# Patient Record
Sex: MALE | Race: BLACK OR AFRICAN AMERICAN | NOT HISPANIC OR LATINO
[De-identification: names, ages, dates, MRNs, and addresses within clinical notes are randomized per-mention and may not be internally consistent; named-entity substitution may affect disease eponyms.]

---

## 2017-01-12 ENCOUNTER — APPOINTMENT (OUTPATIENT)
Dept: FAMILY MEDICINE | Facility: CLINIC | Age: 47
End: 2017-01-12

## 2017-01-12 ENCOUNTER — LABORATORY RESULT (OUTPATIENT)
Age: 47
End: 2017-01-12

## 2017-01-12 VITALS
DIASTOLIC BLOOD PRESSURE: 103 MMHG | OXYGEN SATURATION: 95 % | WEIGHT: 271 LBS | SYSTOLIC BLOOD PRESSURE: 154 MMHG | BODY MASS INDEX: 34.78 KG/M2 | HEIGHT: 74 IN | HEART RATE: 72 BPM | TEMPERATURE: 98.1 F

## 2017-01-12 DIAGNOSIS — G89.4 CHRONIC PAIN SYNDROME: ICD-10-CM

## 2017-01-12 DIAGNOSIS — B35.1 TINEA UNGUIUM: ICD-10-CM

## 2017-01-12 DIAGNOSIS — M54.9 DORSALGIA, UNSPECIFIED: ICD-10-CM

## 2017-01-12 DIAGNOSIS — G89.29 DORSALGIA, UNSPECIFIED: ICD-10-CM

## 2017-01-12 DIAGNOSIS — M25.569 PAIN IN UNSPECIFIED KNEE: ICD-10-CM

## 2017-01-12 DIAGNOSIS — Z78.9 OTHER SPECIFIED HEALTH STATUS: ICD-10-CM

## 2017-01-12 LAB
GLUCOSE BLDC GLUCOMTR-MCNC: 267
HBA1C MFR BLD HPLC: 12.1

## 2017-01-14 LAB
25(OH)D3 SERPL-MCNC: 10.1 NG/ML
ALBUMIN SERPL ELPH-MCNC: 4.1 G/DL
ALP BLD-CCNC: 126 U/L
ALT SERPL-CCNC: 17 U/L
ANION GAP SERPL CALC-SCNC: 18 MMOL/L
APPEARANCE: ABNORMAL
AST SERPL-CCNC: 12 U/L
BACTERIA: NEGATIVE
BASOPHILS # BLD AUTO: 0 K/UL
BASOPHILS NFR BLD AUTO: 0 %
BILIRUB SERPL-MCNC: 0.2 MG/DL
BILIRUBIN URINE: NEGATIVE
BLOOD URINE: NEGATIVE
BUN SERPL-MCNC: 12 MG/DL
CALCIUM SERPL-MCNC: 10 MG/DL
CHLORIDE SERPL-SCNC: 100 MMOL/L
CHOLEST SERPL-MCNC: 155 MG/DL
CHOLEST/HDLC SERPL: 5.3 RATIO
CO2 SERPL-SCNC: 22 MMOL/L
COLOR: YELLOW
CREAT SERPL-MCNC: 0.99 MG/DL
CREAT SPEC-SCNC: 132 MG/DL
EOSINOPHIL # BLD AUTO: 0.28 K/UL
EOSINOPHIL NFR BLD AUTO: 1.8 %
ERYTHROCYTE [SEDIMENTATION RATE] IN BLOOD BY WESTERGREN METHOD: 27 MM/HR
GLUCOSE QUALITATIVE U: 250 MG/DL
GLUCOSE SERPL-MCNC: 300 MG/DL
HCT VFR BLD CALC: 40.3 %
HDLC SERPL-MCNC: 29 MG/DL
HGB BLD-MCNC: 13.4 G/DL
HYALINE CASTS: 0 /LPF
KETONES URINE: NEGATIVE
LDLC SERPL CALC-MCNC: 60 MG/DL
LEUKOCYTE ESTERASE URINE: NEGATIVE
LYMPHOCYTES # BLD AUTO: 3.13 K/UL
LYMPHOCYTES NFR BLD AUTO: 20.2 %
MAN DIFF?: NORMAL
MCHC RBC-ENTMCNC: 30.2 PG
MCHC RBC-ENTMCNC: 33.3 GM/DL
MCV RBC AUTO: 90.8 FL
MICROALBUMIN 24H UR DL<=1MG/L-MCNC: 11.8 MG/DL
MICROALBUMIN/CREAT 24H UR-RTO: 89 UG/MG
MICROSCOPIC-UA: NORMAL
MONOCYTES # BLD AUTO: 2.05 K/UL
MONOCYTES NFR BLD AUTO: 13.2 %
NEUTROPHILS # BLD AUTO: 10.05 K/UL
NEUTROPHILS NFR BLD AUTO: 64.8 %
NITRITE URINE: NEGATIVE
PH URINE: 6.5
PLATELET # BLD AUTO: 266 K/UL
POTASSIUM SERPL-SCNC: 4 MMOL/L
PROT SERPL-MCNC: 7.6 G/DL
PROTEIN URINE: ABNORMAL MG/DL
PSA SERPL-MCNC: 0.38 NG/ML
RBC # BLD: 4.44 M/UL
RBC # FLD: 13.8 %
RED BLOOD CELLS URINE: 0 /HPF
SODIUM SERPL-SCNC: 141 MMOL/L
SPECIFIC GRAVITY URINE: 1.02
SQUAMOUS EPITHELIAL CELLS: 5 /HPF
TRIGL SERPL-MCNC: 329 MG/DL
TSH SERPL-ACNC: 2.74 UIU/ML
URINE COMMENTS: NORMAL
UROBILINOGEN URINE: NORMAL MG/DL
VIT B12 SERPL-MCNC: 279 PG/ML
WBC # FLD AUTO: 15.51 K/UL
WHITE BLOOD CELLS URINE: 3 /HPF

## 2017-02-21 ENCOUNTER — APPOINTMENT (OUTPATIENT)
Dept: FAMILY MEDICINE | Facility: CLINIC | Age: 47
End: 2017-02-21

## 2017-05-17 ENCOUNTER — APPOINTMENT (OUTPATIENT)
Dept: FAMILY MEDICINE | Facility: CLINIC | Age: 47
End: 2017-05-17

## 2017-05-17 VITALS
BODY MASS INDEX: 34.27 KG/M2 | HEIGHT: 74 IN | OXYGEN SATURATION: 98 % | TEMPERATURE: 98.2 F | DIASTOLIC BLOOD PRESSURE: 90 MMHG | WEIGHT: 267 LBS | HEART RATE: 89 BPM | SYSTOLIC BLOOD PRESSURE: 156 MMHG

## 2017-05-17 DIAGNOSIS — E55.9 VITAMIN D DEFICIENCY, UNSPECIFIED: ICD-10-CM

## 2017-05-17 DIAGNOSIS — R94.31 ABNORMAL ELECTROCARDIOGRAM [ECG] [EKG]: ICD-10-CM

## 2017-05-17 DIAGNOSIS — D72.829 ELEVATED WHITE BLOOD CELL COUNT, UNSPECIFIED: ICD-10-CM

## 2017-05-17 DIAGNOSIS — F17.200 NICOTINE DEPENDENCE, UNSPECIFIED, UNCOMPLICATED: ICD-10-CM

## 2017-05-17 DIAGNOSIS — R05 COUGH: ICD-10-CM

## 2017-05-17 DIAGNOSIS — I10 ESSENTIAL (PRIMARY) HYPERTENSION: ICD-10-CM

## 2017-05-17 DIAGNOSIS — Z78.9 OTHER SPECIFIED HEALTH STATUS: ICD-10-CM

## 2017-05-17 LAB
GLUCOSE BLDC GLUCOMTR-MCNC: 311
HBA1C MFR BLD HPLC: 10.6

## 2017-05-17 RX ORDER — ERGOCALCIFEROL 1.25 MG/1
1.25 MG CAPSULE, LIQUID FILLED ORAL
Qty: 12 | Refills: 0 | Status: DISCONTINUED | COMMUNITY
Start: 2017-01-14 | End: 2017-05-17

## 2017-05-17 RX ORDER — CHROMIUM 200 MCG
1000 TABLET ORAL
Qty: 30 | Refills: 5 | Status: ACTIVE | COMMUNITY
Start: 2017-05-17 | End: 1900-01-01

## 2017-05-18 ENCOUNTER — TRANSCRIPTION ENCOUNTER (OUTPATIENT)
Age: 47
End: 2017-05-18

## 2017-06-28 ENCOUNTER — APPOINTMENT (OUTPATIENT)
Dept: FAMILY MEDICINE | Facility: CLINIC | Age: 47
End: 2017-06-28

## 2017-09-14 ENCOUNTER — APPOINTMENT (OUTPATIENT)
Dept: FAMILY MEDICINE | Facility: CLINIC | Age: 47
End: 2017-09-14

## 2017-09-24 ENCOUNTER — TRANSCRIPTION ENCOUNTER (OUTPATIENT)
Age: 47
End: 2017-09-24

## 2017-09-25 ENCOUNTER — TRANSCRIPTION ENCOUNTER (OUTPATIENT)
Age: 47
End: 2017-09-25

## 2018-05-02 ENCOUNTER — APPOINTMENT (OUTPATIENT)
Dept: FAMILY MEDICINE | Facility: CLINIC | Age: 48
End: 2018-05-02
Payer: SELF-PAY

## 2018-05-02 ENCOUNTER — LABORATORY RESULT (OUTPATIENT)
Age: 48
End: 2018-05-02

## 2018-05-02 VITALS
SYSTOLIC BLOOD PRESSURE: 155 MMHG | HEART RATE: 98 BPM | HEIGHT: 74 IN | DIASTOLIC BLOOD PRESSURE: 104 MMHG | WEIGHT: 260 LBS | TEMPERATURE: 97.7 F | OXYGEN SATURATION: 96 % | BODY MASS INDEX: 33.37 KG/M2

## 2018-05-02 DIAGNOSIS — Z11.3 ENCOUNTER FOR SCREENING FOR INFECTIONS WITH A PREDOMINANTLY SEXUAL MODE OF TRANSMISSION: ICD-10-CM

## 2018-05-02 PROCEDURE — 83036 HEMOGLOBIN GLYCOSYLATED A1C: CPT | Mod: QW

## 2018-05-02 PROCEDURE — 36415 COLL VENOUS BLD VENIPUNCTURE: CPT

## 2018-05-02 PROCEDURE — 99214 OFFICE O/P EST MOD 30 MIN: CPT | Mod: 25

## 2018-05-02 RX ORDER — SYRINGE-NEEDLE,INSULIN,0.5 ML 31 GX5/16"
31G X 5/16" SYRINGE, EMPTY DISPOSABLE MISCELLANEOUS
Qty: 200 | Refills: 3 | Status: ACTIVE | COMMUNITY
Start: 2018-05-02 | End: 1900-01-01

## 2018-05-02 RX ORDER — INSULIN GLARGINE 100 [IU]/ML
100 INJECTION, SOLUTION SUBCUTANEOUS
Qty: 1 | Refills: 3 | Status: DISCONTINUED | COMMUNITY
Start: 2017-01-12 | End: 2018-05-02

## 2018-05-03 LAB
BASOPHILS # BLD AUTO: 0.03 K/UL
BASOPHILS NFR BLD AUTO: 0.2 %
C TRACH RRNA SPEC QL NAA+PROBE: NOT DETECTED
EOSINOPHIL # BLD AUTO: 0.24 K/UL
EOSINOPHIL NFR BLD AUTO: 1.8 %
GLUCOSE BLDC GLUCOMTR-MCNC: 355
HBA1C MFR BLD HPLC: 14
HCT VFR BLD CALC: 43.7 %
HGB BLD-MCNC: 14.2 G/DL
HIV1+2 AB SPEC QL IA.RAPID: NONREACTIVE
IMM GRANULOCYTES NFR BLD AUTO: 0.2 %
LYMPHOCYTES # BLD AUTO: 3.57 K/UL
LYMPHOCYTES NFR BLD AUTO: 27.5 %
MAN DIFF?: NORMAL
MCHC RBC-ENTMCNC: 29.2 PG
MCHC RBC-ENTMCNC: 32.5 GM/DL
MCV RBC AUTO: 89.7 FL
MONOCYTES # BLD AUTO: 0.98 K/UL
MONOCYTES NFR BLD AUTO: 7.5 %
N GONORRHOEA RRNA SPEC QL NAA+PROBE: NOT DETECTED
NEUTROPHILS # BLD AUTO: 8.14 K/UL
NEUTROPHILS NFR BLD AUTO: 62.8 %
PLATELET # BLD AUTO: 319 K/UL
RBC # BLD: 4.87 M/UL
RBC # FLD: 13.1 %
SOURCE AMPLIFICATION: NORMAL
WBC # FLD AUTO: 12.99 K/UL

## 2018-06-03 ENCOUNTER — INPATIENT (INPATIENT)
Facility: HOSPITAL | Age: 48
LOS: 0 days | Discharge: HOME | DRG: 103 | End: 2018-06-04
Attending: INTERNAL MEDICINE | Admitting: INTERNAL MEDICINE
Payer: COMMERCIAL

## 2018-06-03 VITALS
HEIGHT: 68 IN | HEART RATE: 71 BPM | TEMPERATURE: 98 F | DIASTOLIC BLOOD PRESSURE: 92 MMHG | WEIGHT: 257.94 LBS | RESPIRATION RATE: 16 BRPM | OXYGEN SATURATION: 99 % | SYSTOLIC BLOOD PRESSURE: 149 MMHG

## 2018-06-03 DIAGNOSIS — E11.42 TYPE 2 DIABETES MELLITUS WITH DIABETIC POLYNEUROPATHY: ICD-10-CM

## 2018-06-03 DIAGNOSIS — F17.200 NICOTINE DEPENDENCE, UNSPECIFIED, UNCOMPLICATED: ICD-10-CM

## 2018-06-03 DIAGNOSIS — I10 ESSENTIAL (PRIMARY) HYPERTENSION: ICD-10-CM

## 2018-06-03 DIAGNOSIS — R51 HEADACHE: ICD-10-CM

## 2018-06-03 DIAGNOSIS — I63.9 CEREBRAL INFARCTION, UNSPECIFIED: ICD-10-CM

## 2018-06-03 DIAGNOSIS — Z86.73 PERSONAL HISTORY OF TRANSIENT ISCHEMIC ATTACK (TIA), AND CEREBRAL INFARCTION WITHOUT RESIDUAL DEFICITS: ICD-10-CM

## 2018-06-03 LAB
APTT BLD: 37.4 SEC — SIGNIFICANT CHANGE UP (ref 27.5–37.4)
BASOPHILS # BLD AUTO: 0 K/UL — SIGNIFICANT CHANGE UP (ref 0–0.2)
BASOPHILS NFR BLD AUTO: 0.2 % — SIGNIFICANT CHANGE UP (ref 0–2)
EOSINOPHIL # BLD AUTO: 0.3 K/UL — SIGNIFICANT CHANGE UP (ref 0–0.5)
EOSINOPHIL NFR BLD AUTO: 2.3 % — SIGNIFICANT CHANGE UP (ref 0–5)
GLUCOSE BLDC GLUCOMTR-MCNC: 189 MG/DL — HIGH (ref 70–99)
HCT VFR BLD CALC: 44.3 % — SIGNIFICANT CHANGE UP (ref 42–52)
HGB BLD-MCNC: 14.6 G/DL — SIGNIFICANT CHANGE UP (ref 14–18)
INR BLD: 1.14 RATIO — SIGNIFICANT CHANGE UP (ref 0.88–1.16)
LYMPHOCYTES # BLD AUTO: 25.7 % — SIGNIFICANT CHANGE UP (ref 20–55)
LYMPHOCYTES # BLD AUTO: 3 K/UL — SIGNIFICANT CHANGE UP (ref 1–4.8)
MCHC RBC-ENTMCNC: 29.1 PG — SIGNIFICANT CHANGE UP (ref 27–31)
MCHC RBC-ENTMCNC: 33 G/DL — SIGNIFICANT CHANGE UP (ref 32–36)
MCV RBC AUTO: 88.4 FL — SIGNIFICANT CHANGE UP (ref 80–94)
MONOCYTES # BLD AUTO: 1.2 K/UL — HIGH (ref 0–0.8)
MONOCYTES NFR BLD AUTO: 10.4 % — HIGH (ref 3–10)
NEUTROPHILS # BLD AUTO: 7.2 K/UL — SIGNIFICANT CHANGE UP (ref 1.8–8)
NEUTROPHILS NFR BLD AUTO: 61.1 % — SIGNIFICANT CHANGE UP (ref 37–73)
PLATELET # BLD AUTO: 229 K/UL — SIGNIFICANT CHANGE UP (ref 150–400)
PROTHROM AB SERPL-ACNC: 12.6 SEC — SIGNIFICANT CHANGE UP (ref 9.8–12.7)
RAPID RVP RESULT: SIGNIFICANT CHANGE UP
RBC # BLD: 5.01 M/UL — SIGNIFICANT CHANGE UP (ref 4.6–6.2)
RBC # FLD: 13.2 % — SIGNIFICANT CHANGE UP (ref 11–15.6)
TROPONIN T SERPL-MCNC: <0.01 NG/ML — SIGNIFICANT CHANGE UP (ref 0–0.06)
WBC # BLD: 11.8 K/UL — HIGH (ref 4.8–10.8)
WBC # FLD AUTO: 11.8 K/UL — HIGH (ref 4.8–10.8)

## 2018-06-03 PROCEDURE — 99223 1ST HOSP IP/OBS HIGH 75: CPT

## 2018-06-03 PROCEDURE — 70450 CT HEAD/BRAIN W/O DYE: CPT | Mod: 26

## 2018-06-03 PROCEDURE — 99291 CRITICAL CARE FIRST HOUR: CPT

## 2018-06-03 PROCEDURE — 93010 ELECTROCARDIOGRAM REPORT: CPT

## 2018-06-03 RX ORDER — ACETAMINOPHEN 500 MG
650 TABLET ORAL EVERY 6 HOURS
Qty: 0 | Refills: 0 | Status: DISCONTINUED | OUTPATIENT
Start: 2018-06-03 | End: 2018-06-04

## 2018-06-03 RX ORDER — ASPIRIN/CALCIUM CARB/MAGNESIUM 324 MG
325 TABLET ORAL ONCE
Qty: 0 | Refills: 0 | Status: COMPLETED | OUTPATIENT
Start: 2018-06-03 | End: 2018-06-03

## 2018-06-03 RX ORDER — FAMOTIDINE 10 MG/ML
20 INJECTION INTRAVENOUS
Qty: 0 | Refills: 0 | Status: DISCONTINUED | OUTPATIENT
Start: 2018-06-03 | End: 2018-06-04

## 2018-06-03 RX ORDER — DEXTROSE 50 % IN WATER 50 %
15 SYRINGE (ML) INTRAVENOUS ONCE
Qty: 0 | Refills: 0 | Status: DISCONTINUED | OUTPATIENT
Start: 2018-06-03 | End: 2018-06-04

## 2018-06-03 RX ORDER — ASPIRIN/CALCIUM CARB/MAGNESIUM 324 MG
325 TABLET ORAL DAILY
Qty: 0 | Refills: 0 | Status: DISCONTINUED | OUTPATIENT
Start: 2018-06-03 | End: 2018-06-04

## 2018-06-03 RX ORDER — AMLODIPINE BESYLATE 2.5 MG/1
10 TABLET ORAL DAILY
Qty: 0 | Refills: 0 | Status: DISCONTINUED | OUTPATIENT
Start: 2018-06-03 | End: 2018-06-04

## 2018-06-03 RX ORDER — NICOTINE POLACRILEX 2 MG
1 GUM BUCCAL DAILY
Qty: 0 | Refills: 0 | Status: DISCONTINUED | OUTPATIENT
Start: 2018-06-03 | End: 2018-06-04

## 2018-06-03 RX ORDER — GABAPENTIN 400 MG/1
300 CAPSULE ORAL THREE TIMES A DAY
Qty: 0 | Refills: 0 | Status: DISCONTINUED | OUTPATIENT
Start: 2018-06-03 | End: 2018-06-04

## 2018-06-03 RX ORDER — LABETALOL HCL 100 MG
10 TABLET ORAL ONCE
Qty: 0 | Refills: 0 | Status: COMPLETED | OUTPATIENT
Start: 2018-06-03 | End: 2018-06-03

## 2018-06-03 RX ORDER — INSULIN LISPRO 100/ML
VIAL (ML) SUBCUTANEOUS
Qty: 0 | Refills: 0 | Status: DISCONTINUED | OUTPATIENT
Start: 2018-06-03 | End: 2018-06-04

## 2018-06-03 RX ORDER — GLUCAGON INJECTION, SOLUTION 0.5 MG/.1ML
1 INJECTION, SOLUTION SUBCUTANEOUS ONCE
Qty: 0 | Refills: 0 | Status: DISCONTINUED | OUTPATIENT
Start: 2018-06-03 | End: 2018-06-04

## 2018-06-03 RX ORDER — METOPROLOL TARTRATE 50 MG
25 TABLET ORAL
Qty: 0 | Refills: 0 | Status: DISCONTINUED | OUTPATIENT
Start: 2018-06-03 | End: 2018-06-04

## 2018-06-03 RX ORDER — SODIUM CHLORIDE 9 MG/ML
1000 INJECTION, SOLUTION INTRAVENOUS
Qty: 0 | Refills: 0 | Status: DISCONTINUED | OUTPATIENT
Start: 2018-06-03 | End: 2018-06-04

## 2018-06-03 RX ORDER — ACETAMINOPHEN 500 MG
1000 TABLET ORAL ONCE
Qty: 0 | Refills: 0 | Status: COMPLETED | OUTPATIENT
Start: 2018-06-03 | End: 2018-06-03

## 2018-06-03 RX ORDER — INSULIN GLARGINE 100 [IU]/ML
30 INJECTION, SOLUTION SUBCUTANEOUS AT BEDTIME
Qty: 0 | Refills: 0 | Status: DISCONTINUED | OUTPATIENT
Start: 2018-06-03 | End: 2018-06-04

## 2018-06-03 RX ORDER — DEXTROSE 50 % IN WATER 50 %
25 SYRINGE (ML) INTRAVENOUS ONCE
Qty: 0 | Refills: 0 | Status: DISCONTINUED | OUTPATIENT
Start: 2018-06-03 | End: 2018-06-04

## 2018-06-03 RX ORDER — ATORVASTATIN CALCIUM 80 MG/1
40 TABLET, FILM COATED ORAL AT BEDTIME
Qty: 0 | Refills: 0 | Status: DISCONTINUED | OUTPATIENT
Start: 2018-06-03 | End: 2018-06-04

## 2018-06-03 RX ORDER — HYDRALAZINE HCL 50 MG
10 TABLET ORAL EVERY 6 HOURS
Qty: 0 | Refills: 0 | Status: DISCONTINUED | OUTPATIENT
Start: 2018-06-03 | End: 2018-06-04

## 2018-06-03 RX ORDER — DEXTROSE 50 % IN WATER 50 %
12.5 SYRINGE (ML) INTRAVENOUS ONCE
Qty: 0 | Refills: 0 | Status: DISCONTINUED | OUTPATIENT
Start: 2018-06-03 | End: 2018-06-04

## 2018-06-03 RX ADMIN — FAMOTIDINE 20 MILLIGRAM(S): 10 INJECTION INTRAVENOUS at 18:21

## 2018-06-03 RX ADMIN — Medication 1000 MILLIGRAM(S): at 18:20

## 2018-06-03 RX ADMIN — GABAPENTIN 300 MILLIGRAM(S): 400 CAPSULE ORAL at 21:49

## 2018-06-03 RX ADMIN — Medication 25 MILLIGRAM(S): at 18:03

## 2018-06-03 RX ADMIN — Medication 2: at 17:46

## 2018-06-03 RX ADMIN — Medication 400 MILLIGRAM(S): at 17:30

## 2018-06-03 RX ADMIN — ATORVASTATIN CALCIUM 40 MILLIGRAM(S): 80 TABLET, FILM COATED ORAL at 21:48

## 2018-06-03 RX ADMIN — Medication 650 MILLIGRAM(S): at 22:17

## 2018-06-03 RX ADMIN — INSULIN GLARGINE 30 UNIT(S): 100 INJECTION, SOLUTION SUBCUTANEOUS at 21:50

## 2018-06-03 RX ADMIN — Medication 650 MILLIGRAM(S): at 21:49

## 2018-06-03 NOTE — ED PROVIDER NOTE - MEDICAL DECISION MAKING DETAILS
48 y/o M 46 y/o M presents with worsening headache and b/l LE numbness - pt had CVA on Tuesday, d/c 2 days ago on aspirin, and woke up with presenting symptoms today: Will get labs, CT brain, and reevaluate.

## 2018-06-03 NOTE — ED ADULT NURSE NOTE - OBJECTIVE STATEMENT
47 year old a&ox3 male comes to ED c/o of worsening left facial droop as per wife patient went to [facility] and was diagnosed with an ischemic stroke Tuesday discharged from [facility], no tPa, as per wife she went to go give him his medications this morning at 1045, c/o worst headache of his life, numbness to feet,

## 2018-06-03 NOTE — H&P ADULT - ASSESSMENT
48 yo M w/ hx DM2 on insulin, smoker presents for acute onset headache (frontal, throbbing, no photophobia, no previous episodes of similar intensity, no alleviating factors).  recently discharged  (5 days ago) from [facility] for acute CVA

## 2018-06-03 NOTE — ED PROVIDER NOTE - OBJECTIVE STATEMENT
48 y/o M with past hx of CVA presents to the ED c/o worsening headache and b/l LE numbness which onset today at 1100. Pt describes his headache as "pounding". As per pt's significant other, pt woke up with presenting symptoms. Last well known was last night. He had CVA on Tuesday, was treated at [facility], and d/c 2 days ago on aspirin; he was told "he had a clot". He had slurred speech on Tuesday but notes that his speech has been improving. Pt is still slightly off balance from prior stroke 4 days ago. He denies CP, SOB, abdominal pain, or blurry vision. No further complaints at this time. 46 y/o M with past hx of CVA presents to the ED c/o worsening headache and b/l LE numbness which onset today at 1100. Pt describes his headache as "pounding". As per pt's significant other, pt woke up with presenting symptoms. Last well known was last night. He had CVA on Tuesday, was treated at [facility], and d/c 2 days ago on aspirin; he was told "he had a clot". He had slurred speech on Tuesday but notes that his speech has been improving and says he is still slightly off balance from prior stroke 4 days ago. Pt is a smoker but does not drink or do any drugs. He denies CP, SOB, abdominal pain, or blurry vision. No further complaints at this time. 46 y/o M with past hx of CVA presents to the ED c/o worsening headache and b/l LE numbness which noticed upon waking up at 1100. Pt describes his headache as "pounding". As per pt's significant other, pt woke up with presenting symptoms. Last well known was last night. He had CVA on Tuesday, was treated at [facility], and d/c 2 days ago on aspirin; he was told "he had a clot". He had slurred speech on Tuesday but notes that his speech has been improving and says he is still slightly off balance from prior stroke 4 days ago. Pt is a smoker but does not drink or do any drugs. He denies CP, SOB, abdominal pain, or blurry vision. No further complaints at this time.

## 2018-06-03 NOTE — ED ADULT NURSE NOTE - CHIEF COMPLAINT QUOTE
sudden onset headache this am. recently d/c'ed from [place] for cva. pt reports baseline slurred speech since recent stroke. a and o x3. breathing even and unlabored. JOHN cornejo called to bedside for eval.

## 2018-06-03 NOTE — ED ADULT TRIAGE NOTE - AS HEIGHT TYPE
Acute Otitis Media with Infection (Child)    Your child has a middle ear infection (acute otitis media). It is caused by bacteria or fungi. The middle ear is the space behind the eardrum. The eustachian tube connects the ear to the nasal passage. The eustachian tubes help drain fluid from the ears. They also keep the air pressure equal inside and outside the ears. These tubes are shorter and more horizontal in children. This makes it more likely for the tubes to become blocked. A blockage lets fluid and pressure build up in the middle ear. Bacteria or fungi can grow in this fluid and cause an ear infection. This infection is commonly known as an earache.  The main symptom of an ear infection is ear pain. Other symptoms may include pulling at the ear, being more fussy than usual, decreased appetite, and vomiting or diarrhea. Your childs hearing may also be affected. Your child may have had a respiratory infection first.  An ear infection may clear up on its own. Or your child may need to take medicine. After the infection goes away, your child may still have fluid in the middle ear. It may take weeks or months for this fluid to go away. During that time, your child may have temporary hearing loss. But all other symptoms of the earache should be gone.  Home care  Follow these guidelines when caring for your child at home:  · The healthcare provider will likely prescribe medicines for pain. The provider may also prescribe antibiotics or antifungals to treat the infection. These may be liquid medicines to give by mouth. Or they may be ear drops. Follow the providers instructions for giving these medicines to your child.  · Because ear infections can clear up on their own, the provider may suggest waiting for a few days before giving your child medicines for infection.  · To reduce pain, have your child rest in an upright position. Hot or cold compresses held against the ear may help ease pain.  · Keep the ear dry.  Have your child wear a shower cap when bathing.  To help prevent future infections:  · Avoid smoking near your child. Secondhand smoke raises the risk for ear infections in children.  · Make sure your child gets all appropriate vaccines.  · Do not bottle-feed while your baby is lying on his or her back. (This position can cause middle ear infections because it allows milk to run into the eustachian tubes.)      · If you breastfeed, continue until your child is 6 to 12 months of age.  To apply ear drops:  1. Put the bottle in warm water if the medicine is kept in the refrigerator. Cold drops in the ear are uncomfortable.  2. Have your child lie down on a flat surface. Gently hold your childs head to one side.  3. Remove any drainage from the ear with a clean tissue or cotton swab. Clean only the outer ear. Dont put the cotton swab into the ear canal.  4. Straighten the ear canal by gently pulling the earlobe up and back.  5. Keep the dropper a half-inch above the ear canal. This will keep the dropper from becoming contaminated. Put the drops against the side of the ear canal.  6. Have your child stay lying down for 2 to 3 minutes. This gives time for the medicine to enter the ear canal. If your child doesnt have pain, gently massage the outer ear near the opening.  7. Wipe any extra medicine away from the outer ear with a clean cotton ball.  Follow-up care  Follow up with your childs healthcare provider as directed. Your child will need to have the ear rechecked to make sure the infection has resolved. Check with your doctor to see when they want to see your child.  Special note to parents  If your child continues to get earaches, he or she may need ear tubes. The provider will put small tubes in your childs eardrum to help keep fluid from building up. This procedure is a simple and works well.  When to seek medical advice  Unless advised otherwise, call your child's healthcare provider if:  · Your child is 3  months old or younger and has a fever of 100.4°F (38°C) or higher. Your child may need to see a healthcare provider.  · Your child is of any age and has fevers higher than 104°F (40°C) that come back again and again.  Call your child's healthcare provider for any of the following:  · New symptoms, especially swelling around the ear or weakness of face muscles  · Severe pain  · Infection seems to get worse, not better   · Neck pain  · Your child acts very sick or not himself or herself  · Fever or pain do not improve with antibiotics after 48 hours  Date Last Reviewed: 5/3/2015  © 1186-0938 [employer]. [address] [place] [address], [place] [address]. All rights reserved. This information is not intended as a substitute for professional medical care. Always follow your healthcare professional's instructions.         stated

## 2018-06-03 NOTE — H&P ADULT - HISTORY OF PRESENT ILLNESS
46 yo M w/ hx DM2 on insulin, smoker presents for acute onset headache (frontal, throbbing, no photophobia, no previous episodes of similar intensity, no alleviating factors).  recently discharged  (5 days ago) from [facility] for acute CVA after noting gait disturbance and speech difficulties. underwent echo/carotids and CT head.  no MRI given metal fragment in lip.   Also complaining of pins/needles of b/l feets (chronic).

## 2018-06-03 NOTE — ED PROVIDER NOTE - CRITICAL CARE PROVIDED
direct patient care (not related to procedure)/interpretation of diagnostic studies/documentation/additional history taking/consultation with other physicians

## 2018-06-03 NOTE — CONSULT NOTE ADULT - ASSESSMENT
The patient is a 47y Male with recent stroke several days ago. He [place] [facility]   He now presents with headache.    1) CVA.  Continue antiplatelet and statin.   Recent work up done. Recommend obtaining records from [facility] if possible.     2) Headache.   Possibly related to blood pressure.  Symptomatic treatment for now.     3) Hypertension.  Control blood pressure per medicine.     4) Diabetes Mellitus with neuropathy.   Continue Gabapentin.  Follow blood glucose.     Case discussed with Dr Walker.

## 2018-06-03 NOTE — ED ADULT TRIAGE NOTE - CHIEF COMPLAINT QUOTE
sudden onset headache this am. recently d/c'ed from [place] for cva. pt reports baseline slurred speech since recent stroke. a and o x3. breathing even and unlabored. JOHN cornejo called to bedside for eval.

## 2018-06-03 NOTE — ED ADULT NURSE REASSESSMENT NOTE - PAIN: RESPONSE TO INTERVENTIONS
increase in pain/pain rating (specify)/pt c/o severe headache. pt medicated as per MD order will reassess pain level

## 2018-06-03 NOTE — ED PROVIDER NOTE - CARE PLAN
Principal Discharge DX:	CVA (cerebral vascular accident)  Secondary Diagnosis:	DM (diabetes mellitus)  Secondary Diagnosis:	HTN (hypertension)

## 2018-06-04 ENCOUNTER — TRANSCRIPTION ENCOUNTER (OUTPATIENT)
Age: 48
End: 2018-06-04

## 2018-06-04 VITALS
DIASTOLIC BLOOD PRESSURE: 67 MMHG | HEART RATE: 64 BPM | OXYGEN SATURATION: 94 % | TEMPERATURE: 98 F | RESPIRATION RATE: 14 BRPM | SYSTOLIC BLOOD PRESSURE: 124 MMHG

## 2018-06-04 LAB
ANION GAP SERPL CALC-SCNC: 16 MMOL/L — SIGNIFICANT CHANGE UP (ref 5–17)
BASOPHILS # BLD AUTO: 0 K/UL — SIGNIFICANT CHANGE UP (ref 0–0.2)
BASOPHILS NFR BLD AUTO: 0.2 % — SIGNIFICANT CHANGE UP (ref 0–2)
BUN SERPL-MCNC: 14 MG/DL — SIGNIFICANT CHANGE UP (ref 8–20)
CALCIUM SERPL-MCNC: 9.4 MG/DL — SIGNIFICANT CHANGE UP (ref 8.6–10.2)
CHLORIDE SERPL-SCNC: 104 MMOL/L — SIGNIFICANT CHANGE UP (ref 98–107)
CHOLEST SERPL-MCNC: 134 MG/DL — SIGNIFICANT CHANGE UP (ref 110–199)
CO2 SERPL-SCNC: 21 MMOL/L — LOW (ref 22–29)
CREAT SERPL-MCNC: 0.92 MG/DL — SIGNIFICANT CHANGE UP (ref 0.5–1.3)
EOSINOPHIL # BLD AUTO: 0.3 K/UL — SIGNIFICANT CHANGE UP (ref 0–0.5)
EOSINOPHIL NFR BLD AUTO: 2.5 % — SIGNIFICANT CHANGE UP (ref 0–6)
GLUCOSE BLDC GLUCOMTR-MCNC: 135 MG/DL — HIGH (ref 70–99)
GLUCOSE BLDC GLUCOMTR-MCNC: 251 MG/DL — HIGH (ref 70–99)
GLUCOSE SERPL-MCNC: 133 MG/DL — HIGH (ref 70–115)
HBA1C BLD-MCNC: 12.9 % — HIGH (ref 4–5.6)
HCT VFR BLD CALC: 41.2 % — LOW (ref 42–52)
HDLC SERPL-MCNC: 26 MG/DL — LOW
HGB BLD-MCNC: 13.6 G/DL — LOW (ref 14–18)
LIPID PNL WITH DIRECT LDL SERPL: 74 MG/DL — SIGNIFICANT CHANGE UP
LYMPHOCYTES # BLD AUTO: 29.5 % — SIGNIFICANT CHANGE UP (ref 20–55)
LYMPHOCYTES # BLD AUTO: 3.2 K/UL — SIGNIFICANT CHANGE UP (ref 1–4.8)
MCHC RBC-ENTMCNC: 29 PG — SIGNIFICANT CHANGE UP (ref 27–31)
MCHC RBC-ENTMCNC: 33 G/DL — SIGNIFICANT CHANGE UP (ref 32–36)
MCV RBC AUTO: 87.8 FL — SIGNIFICANT CHANGE UP (ref 80–94)
MONOCYTES # BLD AUTO: 1.7 K/UL — HIGH (ref 0–0.8)
MONOCYTES NFR BLD AUTO: 15.6 % — HIGH (ref 3–10)
NEUTROPHILS # BLD AUTO: 5.6 K/UL — SIGNIFICANT CHANGE UP (ref 1.8–8)
NEUTROPHILS NFR BLD AUTO: 52.1 % — SIGNIFICANT CHANGE UP (ref 37–73)
PLATELET # BLD AUTO: 211 K/UL — SIGNIFICANT CHANGE UP (ref 150–400)
POTASSIUM SERPL-MCNC: 3.7 MMOL/L — SIGNIFICANT CHANGE UP (ref 3.5–5.3)
POTASSIUM SERPL-SCNC: 3.7 MMOL/L — SIGNIFICANT CHANGE UP (ref 3.5–5.3)
RBC # BLD: 4.69 M/UL — SIGNIFICANT CHANGE UP (ref 4.6–6.2)
RBC # FLD: 13.4 % — SIGNIFICANT CHANGE UP (ref 11–15.6)
SODIUM SERPL-SCNC: 141 MMOL/L — SIGNIFICANT CHANGE UP (ref 135–145)
TOTAL CHOLESTEROL/HDL RATIO MEASUREMENT: 5 RATIO — SIGNIFICANT CHANGE UP (ref 3.4–9.6)
TRIGL SERPL-MCNC: 169 MG/DL — SIGNIFICANT CHANGE UP (ref 10–200)
TSH SERPL-MCNC: 2.01 UIU/ML — SIGNIFICANT CHANGE UP (ref 0.27–4.2)
WBC # BLD: 10.7 K/UL — SIGNIFICANT CHANGE UP (ref 4.8–10.8)
WBC # FLD AUTO: 10.7 K/UL — SIGNIFICANT CHANGE UP (ref 4.8–10.8)

## 2018-06-04 PROCEDURE — 83036 HEMOGLOBIN GLYCOSYLATED A1C: CPT

## 2018-06-04 PROCEDURE — 93005 ELECTROCARDIOGRAM TRACING: CPT

## 2018-06-04 PROCEDURE — 85027 COMPLETE CBC AUTOMATED: CPT

## 2018-06-04 PROCEDURE — 70450 CT HEAD/BRAIN W/O DYE: CPT

## 2018-06-04 PROCEDURE — 87486 CHLMYD PNEUM DNA AMP PROBE: CPT

## 2018-06-04 PROCEDURE — 99239 HOSP IP/OBS DSCHRG MGMT >30: CPT

## 2018-06-04 PROCEDURE — 82962 GLUCOSE BLOOD TEST: CPT

## 2018-06-04 PROCEDURE — 84443 ASSAY THYROID STIM HORMONE: CPT

## 2018-06-04 PROCEDURE — 84484 ASSAY OF TROPONIN QUANT: CPT

## 2018-06-04 PROCEDURE — 80053 COMPREHEN METABOLIC PANEL: CPT

## 2018-06-04 PROCEDURE — 99222 1ST HOSP IP/OBS MODERATE 55: CPT

## 2018-06-04 PROCEDURE — 99232 SBSQ HOSP IP/OBS MODERATE 35: CPT

## 2018-06-04 PROCEDURE — 87633 RESP VIRUS 12-25 TARGETS: CPT

## 2018-06-04 PROCEDURE — 80048 BASIC METABOLIC PNL TOTAL CA: CPT

## 2018-06-04 PROCEDURE — 85610 PROTHROMBIN TIME: CPT

## 2018-06-04 PROCEDURE — 80061 LIPID PANEL: CPT

## 2018-06-04 PROCEDURE — 85730 THROMBOPLASTIN TIME PARTIAL: CPT

## 2018-06-04 PROCEDURE — 99291 CRITICAL CARE FIRST HOUR: CPT | Mod: 25

## 2018-06-04 PROCEDURE — 87798 DETECT AGENT NOS DNA AMP: CPT

## 2018-06-04 PROCEDURE — 36415 COLL VENOUS BLD VENIPUNCTURE: CPT

## 2018-06-04 PROCEDURE — 87581 M.PNEUMON DNA AMP PROBE: CPT

## 2018-06-04 RX ORDER — ASPIRIN/CALCIUM CARB/MAGNESIUM 324 MG
1 TABLET ORAL
Qty: 0 | Refills: 0 | DISCHARGE
Start: 2018-06-04

## 2018-06-04 RX ORDER — OXYCODONE AND ACETAMINOPHEN 5; 325 MG/1; MG/1
1 TABLET ORAL ONCE
Qty: 0 | Refills: 0 | Status: DISCONTINUED | OUTPATIENT
Start: 2018-06-04 | End: 2018-06-04

## 2018-06-04 RX ORDER — NICOTINE POLACRILEX 2 MG
1 GUM BUCCAL
Qty: 14 | Refills: 0
Start: 2018-06-04 | End: 2018-06-17

## 2018-06-04 RX ORDER — METOPROLOL TARTRATE 50 MG
1 TABLET ORAL
Qty: 0 | Refills: 0 | DISCHARGE
Start: 2018-06-04

## 2018-06-04 RX ORDER — AMLODIPINE BESYLATE 2.5 MG/1
1 TABLET ORAL
Qty: 0 | Refills: 0 | DISCHARGE
Start: 2018-06-04

## 2018-06-04 RX ORDER — ACETAMINOPHEN 500 MG
2 TABLET ORAL
Qty: 0 | Refills: 0 | DISCHARGE
Start: 2018-06-04

## 2018-06-04 RX ORDER — ATORVASTATIN CALCIUM 80 MG/1
1 TABLET, FILM COATED ORAL
Qty: 0 | Refills: 0 | DISCHARGE
Start: 2018-06-04

## 2018-06-04 RX ORDER — INSULIN GLARGINE 100 [IU]/ML
30 INJECTION, SOLUTION SUBCUTANEOUS
Qty: 0 | Refills: 0 | DISCHARGE
Start: 2018-06-04

## 2018-06-04 RX ORDER — GABAPENTIN 400 MG/1
1 CAPSULE ORAL
Qty: 0 | Refills: 0 | DISCHARGE
Start: 2018-06-04

## 2018-06-04 RX ORDER — FAMOTIDINE 10 MG/ML
1 INJECTION INTRAVENOUS
Qty: 0 | Refills: 0 | DISCHARGE
Start: 2018-06-04

## 2018-06-04 RX ORDER — OXYCODONE AND ACETAMINOPHEN 5; 325 MG/1; MG/1
1 TABLET ORAL EVERY 6 HOURS
Qty: 0 | Refills: 0 | Status: DISCONTINUED | OUTPATIENT
Start: 2018-06-04 | End: 2018-06-04

## 2018-06-04 RX ADMIN — OXYCODONE AND ACETAMINOPHEN 1 TABLET(S): 5; 325 TABLET ORAL at 02:31

## 2018-06-04 RX ADMIN — OXYCODONE AND ACETAMINOPHEN 1 TABLET(S): 5; 325 TABLET ORAL at 09:31

## 2018-06-04 RX ADMIN — AMLODIPINE BESYLATE 10 MILLIGRAM(S): 2.5 TABLET ORAL at 05:40

## 2018-06-04 RX ADMIN — Medication 25 MILLIGRAM(S): at 05:40

## 2018-06-04 RX ADMIN — FAMOTIDINE 20 MILLIGRAM(S): 10 INJECTION INTRAVENOUS at 05:40

## 2018-06-04 RX ADMIN — OXYCODONE AND ACETAMINOPHEN 1 TABLET(S): 5; 325 TABLET ORAL at 09:09

## 2018-06-04 RX ADMIN — Medication 325 MILLIGRAM(S): at 12:54

## 2018-06-04 RX ADMIN — Medication 1 PATCH: at 12:51

## 2018-06-04 RX ADMIN — Medication 6: at 12:50

## 2018-06-04 RX ADMIN — OXYCODONE AND ACETAMINOPHEN 1 TABLET(S): 5; 325 TABLET ORAL at 04:13

## 2018-06-04 RX ADMIN — GABAPENTIN 300 MILLIGRAM(S): 400 CAPSULE ORAL at 05:40

## 2018-06-04 NOTE — DISCHARGE NOTE ADULT - HOSPITAL COURSE
46 yo M w/ hx DM2 on insulin, smoker presents for acute onset headache (frontal, throbbing, no photophobia, no previous episodes of similar intensity, no alleviating factors).  recently [place]  [place]5 [place] ago[place] [facility] [place] after noting gait disturbance and speech difficulties. underwent echo/carotids and CT head.  no MRI given metal fragment in lip.   Also complaining of pins/needles of b/l feets (chronic).     CT head done in ER showing acute/subacute CVA. patient neurologically intact. seen by neurology, continued on home medications and tylenol given for headaches.  stable for discharge home.  dc planning 32 minutes. d/w neurology and patient in detail.  VSS afebrile. no acute complaints  aaox3, nad rrr s1s2 ctab soft abdomen no LE edema  nonfocal neuro.

## 2018-06-04 NOTE — DISCHARGE NOTE ADULT - PROVIDER TOKENS
TOKEN:'[id number]:'[id number]' TOKEN:'[id number]:'[id number]:'[id number]'

## 2018-06-04 NOTE — DISCHARGE NOTE ADULT - PATIENT PORTAL LINK FT
You can access the [facility] Patient Portal, offered by [facility], by registering with the following website: [email or web address]

## 2018-06-04 NOTE — CONSULT NOTE ADULT - SUBJECTIVE AND OBJECTIVE BOX
47yM was admitted on 06-03 with a headache and difficulty walking. He was recently DC from [place] with new CVA.    A head CT showed acute/subacute 1.5 cm infarction in the RIGHT basal ganglia. Old tiny lacunar infarction is seen in the RIGHT thalamus.       As per patient, he feels that he is back to his baseline and has been ambulating on his own to the bathroom. Ate breakfast without difficulty.     REVIEW OF SYSTEMS  Constitutional - No fever, No weight loss, +fatigue  HEENT - No eye pain, No visual disturbances, No difficulty hearing, No tinnitus, No vertigo, No neck pain  Respiratory - No cough, No wheezing, No shortness of breath  Cardiovascular - No chest pain, No palpitations  Gastrointestinal - No abdominal pain, No nausea, No vomiting, No diarrhea, No constipation  Genitourinary - No dysuria, No frequency, No hematuria, No incontinence  Neurological - No headaches, No memory loss, +loss of strength, +numbness, No tremors  Skin - No itching, No rashes, No lesions   Endocrine - No temperature intolerance  Musculoskeletal - No joint pain, No joint swelling, No muscle pain  Psychiatric - No depression, +anxiety    VITALS  T(C): 36.4 (06-04-18 @ 10:55), Max: 36.8 (06-03-18 @ 11:53)  HR: 64 (06-04-18 @ 10:55) (63 - 82)  BP: 124/67 (06-04-18 @ 10:55) (119/70 - 160/90)  RR: 14 (06-04-18 @ 10:55) (14 - 18)  SpO2: 94% (06-04-18 @ 10:55) (94% - 99%)  Wt(kg): --    PAST MEDICAL & SURGICAL HISTORY  CVA (cerebral vascular accident)  No significant past surgical history      SOCIAL HISTORY  Smoking - +  EtOH - Denied   Drugs - Denied    FUNCTIONAL HISTORY  Independent    CURRENT FUNCTIONAL STATUS  As per family, has been up and ambulating on his own without assistance.    FAMILY HISTORY   No pertinent family history in first degree relatives      RECENT LABS/IMAGING  CBC Full  -  ( 04 Jun 2018 08:39 )  WBC Count : 10.7 K/uL  Hemoglobin : 13.6 g/dL  Hematocrit : 41.2 %  Platelet Count - Automated : 211 K/uL  Mean Cell Volume : 87.8 fl  Mean Cell Hemoglobin : 29.0 pg  Mean Cell Hemoglobin Concentration : 33.0 g/dL  Auto Neutrophil # : 5.6 K/uL  Auto Lymphocyte # : 3.2 K/uL  Auto Monocyte # : 1.7 K/uL  Auto Eosinophil # : 0.3 K/uL  Auto Basophil # : 0.0 K/uL  Auto Neutrophil % : 52.1 %  Auto Lymphocyte % : 29.5 %  Auto Monocyte % : 15.6 %  Auto Eosinophil % : 2.5 %  Auto Basophil % : 0.2 %    06-04    141  |  104  |  14.0  ----------------------------<  133<H>  3.7   |  21.0<L>  |  0.92    Ca    9.4      04 Jun 2018 08:39    TPro  7.7  /  Alb  4.0  /  TBili  0.4  /  DBili  x   /  AST  22  /  ALT  31  /  AlkPhos  110  06-03        ALLERGIES  No Known Allergies      MEDICATIONS   acetaminophen   Tablet. 650 milliGRAM(s) Oral every 6 hours PRN  amLODIPine   Tablet 10 milliGRAM(s) Oral daily  aspirin 325 milliGRAM(s) Oral daily  atorvastatin 40 milliGRAM(s) Oral at bedtime  dextrose 40% Gel 15 Gram(s) Oral once PRN  dextrose 5%. 1000 milliLiter(s) IV Continuous <Continuous>  dextrose 50% Injectable 12.5 Gram(s) IV Push once  dextrose 50% Injectable 25 Gram(s) IV Push once  dextrose 50% Injectable 25 Gram(s) IV Push once  famotidine    Tablet 20 milliGRAM(s) Oral two times a day  gabapentin 300 milliGRAM(s) Oral three times a day  glucagon  Injectable 1 milliGRAM(s) IntraMuscular once PRN  hydrALAZINE Injectable 10 milliGRAM(s) IV Push every 6 hours PRN  insulin glargine Injectable (LANTUS) 30 Unit(s) SubCutaneous at bedtime  insulin lispro (HumaLOG) corrective regimen sliding scale   SubCutaneous three times a day before meals  metoprolol tartrate 25 milliGRAM(s) Oral two times a day  nicotine - 21 mG/24Hr(s) Patch 1 patch Transdermal daily  oxyCODONE    5 mG/acetaminophen 325 mG 1 Tablet(s) Oral every 6 hours PRN      ----------------------------------------------------------------------------------------  PHYSICAL EXAM  Constitutional - NAD, Comfortable  HEENT - NCAT, EOMI  Neck - Supple, No limited ROM  Chest - Breathing comfortably, No wheezing  Cardiovascular - S1S2   Abdomen - Soft, Obese   Extremities - No C/C/E, No calf tenderness   Neurologic Exam -                    Cognitive - Awake, Alert, AAO to self, place, date, year, situation     Communication - Fluent, Mild dysarthria     Motor - No focal deficits     Sensory - Intact to LT  Psychiatric - Mood anxious, wants to leave  ----------------------------------------------------------------------------------------  ASSESSMENT/PLAN  47yMale with functional deficits after recent CVA  CVA - ASA  Pain - Tylenol, Percocet, Neurontin  DVT PPX - SCDs  Rehab - As per family, patient is independent. May need formal eval. Expect patient to achieve functional goals for DC HOME with OUTPATIENT. Will sign off, please reconsult for additional rehab dispo needs if functional status changes.
[facility]                                        Neurology at [place]                                  Aubrey Yanez, & Evelio                                      [address]                                           [place], [place], [address]                                                [phone]        CC: headache     HISTORY:  The patient is a 47y Male who presented to [facility] Tuesday (5 days ago) with unsteadiness and was found to have stroke on CT. He was admitted and stayed until Thursday. He reports that he had numerous tests although no MRI due to metal fragment in lip. He was seen by a neurologist there.   He presented today with headache. This began this morning.  It is pounding in quality.   It is moderately severe.   It has persisted and he has noticed nothing that has made it better or worse.   He also reports some numbness in his feet. He is not clear when this began however he reports that he has been on gabapentin for it.     PAST MEDICAL & SURGICAL HISTORY:  CVA (cerebral vascular accident)  Hypertension.  Diabetes Mellitus with neuropathy.    MEDICATION PRIOR TO ADMISSION:  Insulin  Gabapentin  Norvasc  Atorvastatin   Famotidine  Metformin  Aspirin  Amlodipine   Metoprolol    Allergies  No Known Allergies    SOCIAL HISTORY:  Smoker 1 p/d.    FAMILY HISTORY:  N/C    ROS:  Constitutional: The patient denies fevers or weight changes.  Neuro: Denies dizziness.  Eyes: Denies blurry vision.  Ears/nose/throat: Denies Tinnitus.   Cardiac: Denies chest pain. Denies palpitations.  Respiratory: Denies shortness of breath.  GI: Denies abdominal pain, nausea, or vomiting.  : Denies change in urinary pattern.  Integumentary: Denies rash.  Psych: Denies recent mood changes.  Heme: denies easy bleeding/bruising.    Exam:  Vital Signs Last 24 Hrs  T(F): 98.3 (03 Jun 2018 12:41), Max: 98.3 (03 Jun 2018 11:53)  HR: 67 (03 Jun 2018 13:26) (67 - 71)  BP: 157/95 (03 Jun 2018 13:26) (141/88 - 160/90)  RR: 18 (03 Jun 2018 13:26) (16 - 18)  SpO2: 95% (03 Jun 2018 13:26) (95% - 99%)  General: NAD.   Carotid bruits absent.     Mental status: The patient is awake, alert, and fully oriented. There is no aphasia. There is mild dysarthria.    Cranial nerves: There is no papilledema. Pupils react symmetrically to light. There is no visual field deficit to confrontation. Extraocular motion is full with no nystagmus. There is no ptosis. Facial sensation is intact. Facial musculature is asymmetric with a subtle depression of the left nasolabial fold. Palate elevates symmetrically. Tongue is midline.    Motor: There is normal bulk and tone.  Strength is 5/5 in the right arm and leg.   Strength is 5-/5 in the left arm and leg.     Sensation: Intact to light touch and pin.    Reflexes: 1+ throughout and plantar responses are flexor.    Cerebellar: There is no dysmetria on finger to nose testing.    LABS:                          14.6   11.8  )-----------( 229      ( 03 Jun 2018 12:25 )             44.3       06-03    139  |  102  |  12.0  ----------------------------<  191<H>  4.2   |  22.0  |  0.86    Ca    9.8      03 Jun 2018 12:25    TPro  7.7  /  Alb  4.0  /  TBili  0.4  /  DBili  x   /  AST  22  /  ALT  31  /  AlkPhos  110  06-03      PT/INR - ( 03 Jun 2018 12:25 )   PT: 12.6 sec;   INR: 1.14 ratio    PTT - ( 03 Jun 2018 12:25 )  PTT:37.4 sec    RADIOLOGY   CT head images reviewed (and concur with report): There is an infarct in the right basal ganglia/ internal capsule. This appears subacute to chronic.

## 2018-06-04 NOTE — DISCHARGE NOTE ADULT - PLAN OF CARE
prevention tylenol as needed  follow up with Dr Mclean continue home medications uncontrolled  frequent glucose monitoring  continue insulin therapy  follow up with endocrinology cessation advised home medications

## 2018-06-04 NOTE — DISCHARGE NOTE ADULT - CARE PLAN
Principal Discharge DX:	Acute nonintractable headache, unspecified headache type  Goal:	prevention  Assessment and plan of treatment:	tylenol as needed  follow up with Dr Mclean  Secondary Diagnosis:	History of stroke in prior 3 months  Assessment and plan of treatment:	continue home medications  Secondary Diagnosis:	Type 2 diabetes mellitus with diabetic polyneuropathy, with long-term current use of insulin  Assessment and plan of treatment:	uncontrolled  frequent glucose monitoring  continue insulin therapy  follow up with endocrinology  Secondary Diagnosis:	Smoker  Assessment and plan of treatment:	cessation advised  Secondary Diagnosis:	Essential hypertension  Assessment and plan of treatment:	home medications  Secondary Diagnosis:	Hypercholesteremia  Assessment and plan of treatment:	home medications

## 2018-06-04 NOTE — DISCHARGE NOTE ADULT - SECONDARY DIAGNOSIS.
History of stroke in prior 3 months Type 2 diabetes mellitus with diabetic polyneuropathy, with long-term current use of insulin Smoker Essential hypertension Hypercholesteremia

## 2018-06-04 NOTE — DISCHARGE NOTE ADULT - MEDICATION SUMMARY - MEDICATIONS TO TAKE
I will START or STAY ON the medications listed below when I get home from the hospital:    acetaminophen 325 mg oral tablet  -- 2 tab(s) by mouth every 6 hours, As needed, Mild Pain (1 - 3) or headache  -- Indication: For Headache    aspirin 325 mg oral tablet  -- 1 tab(s) by mouth once a day  -- Indication: For History of stroke in prior 3 months    gabapentin 300 mg oral capsule  -- 1 cap(s) by mouth 3 times a day  -- Indication: For Neuropathy    insulin glargine  -- 30 unit(s) subcutaneous once a day (at bedtime)  -- Indication: For DM (diabetes mellitus)    metFORMIN 850 mg oral tablet  -- 1 tab(s) by mouth 2 times a day  -- Indication: For DM (diabetes mellitus)    atorvastatin 40 mg oral tablet  -- 1 tab(s) by mouth once a day (at bedtime)  -- Indication: For History of stroke in prior 3 months    metoprolol tartrate 25 mg oral tablet  -- 1 tab(s) by mouth 2 times a day  -- Indication: For Hypertension    amLODIPine 10 mg oral tablet  -- 1 tab(s) by mouth once a day  -- Indication: For Hypertension    famotidine 20 mg oral tablet  -- 1 tab(s) by mouth 2 times a day  -- Indication: For reflux I will START or STAY ON the medications listed below when I get home from the hospital:    acetaminophen 325 mg oral tablet  -- 2 tab(s) by mouth every 6 hours, As needed, Mild Pain (1 - 3) or headache  -- Indication: For Headache    aspirin 325 mg oral tablet  -- 1 tab(s) by mouth once a day  -- Indication: For History of stroke in prior 3 months    Percocet 5/325 oral tablet  -- 1 tab(s) by mouth 3 times a day MDD:3  -- Caution federal law prohibits the transfer of this drug to any person other  than the person for whom it was prescribed.  May cause drowsiness.  Alcohol may intensify this effect.  Use care when operating dangerous machinery.  This prescription cannot be refilled.  This product contains acetaminophen.  Do not use  with any other product containing acetaminophen to prevent possible liver damage.  Using more of this medication than prescribed may cause serious breathing problems.    -- Indication: For Chronic pain    gabapentin 300 mg oral capsule  -- 1 cap(s) by mouth 3 times a day  -- Indication: For Neuropathy    insulin glargine  -- 30 unit(s) subcutaneous once a day (at bedtime)  -- Indication: For DM (diabetes mellitus)    metFORMIN 850 mg oral tablet  -- 1 tab(s) by mouth 2 times a day  -- Indication: For DM (diabetes mellitus)    atorvastatin 40 mg oral tablet  -- 1 tab(s) by mouth once a day (at bedtime)  -- Indication: For History of stroke in prior 3 months    metoprolol tartrate 25 mg oral tablet  -- 1 tab(s) by mouth 2 times a day  -- Indication: For Hypertension    amLODIPine 10 mg oral tablet  -- 1 tab(s) by mouth once a day  -- Indication: For Hypertension    famotidine 20 mg oral tablet  -- 1 tab(s) by mouth 2 times a day  -- Indication: For reflux    nicotine 21 mg-14 mg-7 mg transdermal film, extended release  -- 1 each transdermally once a day   -- Do not take this drug if you are pregnant.  For external use only.  It is very important that you take or use this exactly as directed.  Do not skip doses or discontinue unless directed by your doctor.  Remove old patch prior to applying a new patch.    -- Indication: For Smoking

## 2018-06-04 NOTE — DISCHARGE NOTE ADULT - CARE PROVIDER_API CALL
Timothy Parker), Neurology; Vascular Neurology  [address]  [address]  [place] [address]  Phone: [phone]  Fax: [phone]    Kyaw Mclean), Internal Medicine  [address] [place] [address]  Phone: [phone]  Fax: [phone] Timothy Parker), Neurology; Vascular Neurology  [address]  [address]  [place] [address]  Phone: [phone]  Fax: [phone]    Kyaw Mclean), Internal Medicine  [address] [place] [address]  Phone: [phone]  Fax: [phone]    Ellie Maier), EndocrinologyMetabDiabetes  [address], [place] [id number]  Phone: [phone]  Fax: [phone]

## 2018-06-04 NOTE — PROGRESS NOTE ADULT - SUBJECTIVE AND OBJECTIVE BOX
[facility]                                        Neurology at [place]                                 Aubrey Yanez, & Evelio                                  [address]                                        [place], [place], [address]                                             [phone]        CC: headache     HISTORY:  The patient is a 47y Male who presented to [facility] Tuesday (5 days ago) with unsteadiness and was found to have stroke on CT. He was admitted and stayed until Thursday. He reports that he had numerous tests although no MRI due to metal fragment in lip. He was seen by a neurologist there.   He presented yesterday with headache. This began in the morning.  It was pounding in quality.   It was moderately severe.   It had persisted and he has noticed nothing that had made it better or worse.   He also reports some numbness in his feet. He is not clear when this began however he reports that he has been on gabapentin for it.     Interim history:  He now reports that the headache is resolved.    ROS:   Denies headache or dizziness.  Denies chest pain.  Denies shortness of breath.    MEDICATIONS  (STANDING):  amLODIPine   Tablet 10 milliGRAM(s) Oral daily  aspirin 325 milliGRAM(s) Oral daily  atorvastatin 40 milliGRAM(s) Oral at bedtime  dextrose 5%. 1000 milliLiter(s) (50 mL/Hr) IV Continuous <Continuous>  dextrose 50% Injectable 12.5 Gram(s) IV Push once  dextrose 50% Injectable 25 Gram(s) IV Push once  dextrose 50% Injectable 25 Gram(s) IV Push once  famotidine    Tablet 20 milliGRAM(s) Oral two times a day  gabapentin 300 milliGRAM(s) Oral three times a day  insulin glargine Injectable (LANTUS) 30 Unit(s) SubCutaneous at bedtime  insulin lispro (HumaLOG) corrective regimen sliding scale   SubCutaneous three times a day before meals  metoprolol tartrate 25 milliGRAM(s) Oral two times a day  nicotine - 21 mG/24Hr(s) Patch 1 patch Transdermal daily      Vital Signs Last 24 Hrs  T(F): 97.6 (04 Jun 2018 10:55), Max: 98.3 (03 Jun 2018 12:41)  HR: 64 (04 Jun 2018 10:55) (63 - 82)  BP: 124/67 (04 Jun 2018 10:55) (119/70 - 160/90)  RR: 14 (04 Jun 2018 10:55) (14 - 18)  SpO2: 94% (04 Jun 2018 10:55) (94% - 98%)    Detailed Neurologic Exam:    Mental status: The patient is awake, alert, and fully oriented. There is no aphasia. There is mild dysarthria.    Cranial nerves: There is no papilledema. Pupils react symmetrically to light. There is no visual field deficit to confrontation. Extraocular motion is full with no nystagmus. There is no ptosis. Facial sensation is intact. Facial musculature is asymmetric with a subtle depression of the left nasolabial fold. Palate elevates symmetrically. Tongue is midline.    Motor: There is normal bulk and tone.  Strength is 5/5 in the right arm and leg.   Strength is 5-/5 in the left arm and leg.     Sensation: Intact to light touch and pin.    Reflexes: 1+ throughout and plantar responses are flexor.    Cerebellar: There is no dysmetria on finger to nose testing.    Labs:     06-04    141  |  104  |  14.0  ----------------------------<  133<H>  3.7   |  21.0<L>  |  0.92    Ca    9.4      04 Jun 2018 08:39    TPro  7.7  /  Alb  4.0  /  TBili  0.4  /  DBili  x   /  AST  22  /  ALT  31  /  AlkPhos  110  06-03                            13.6   10.7  )-----------( 211      ( 04 Jun 2018 08:39 )             41.2

## 2018-06-04 NOTE — PROGRESS NOTE ADULT - ASSESSMENT
47y Male with recent stroke several days ago. He had work up at [facility].   He now presents with headache.    1) CVA.  Continue antiplatelet and statin.   Recent work up done. Recommend obtaining records from [place] if possible.     2) Headache.   Possibly related to blood pressure.  Now resolved.    3) Hypertension.  Control blood pressure per medicine.     4) Diabetes Mellitus with neuropathy.   Continue Gabapentin.    Case discussed with Dr Walker.

## 2018-06-14 ENCOUNTER — APPOINTMENT (OUTPATIENT)
Dept: FAMILY MEDICINE | Facility: CLINIC | Age: 48
End: 2018-06-14
Payer: MEDICAID

## 2018-06-14 VITALS
WEIGHT: 263 LBS | HEIGHT: 74 IN | DIASTOLIC BLOOD PRESSURE: 86 MMHG | SYSTOLIC BLOOD PRESSURE: 136 MMHG | TEMPERATURE: 98.1 F | HEART RATE: 100 BPM | OXYGEN SATURATION: 93 % | BODY MASS INDEX: 33.75 KG/M2

## 2018-06-14 DIAGNOSIS — G89.29 OTHER CHRONIC PAIN: ICD-10-CM

## 2018-06-14 PROCEDURE — 99496 TRANSJ CARE MGMT HIGH F2F 7D: CPT | Mod: 25

## 2018-06-14 PROCEDURE — 36415 COLL VENOUS BLD VENIPUNCTURE: CPT

## 2018-06-14 PROCEDURE — 99214 OFFICE O/P EST MOD 30 MIN: CPT | Mod: 25

## 2018-06-14 RX ORDER — CLINDAMYCIN HYDROCHLORIDE 300 MG/1
300 CAPSULE ORAL EVERY 8 HOURS
Qty: 30 | Refills: 0 | Status: DISCONTINUED | COMMUNITY
Start: 2018-05-02 | End: 2018-06-14

## 2018-06-14 RX ORDER — ALBUTEROL SULFATE 90 UG/1
108 (90 BASE) AEROSOL, METERED RESPIRATORY (INHALATION)
Qty: 1 | Refills: 2 | Status: DISCONTINUED | COMMUNITY
Start: 2017-05-17 | End: 2018-06-14

## 2018-06-14 NOTE — HEALTH RISK ASSESSMENT
[No falls in past year] : Patient reported no falls in the past year [0] : 2) Feeling down, depressed, or hopeless: Not at all (0) [HIV Test offered] : HIV Test offered [With Significant Other] : lives with significant other [On disability] : on disability [Single] : single [Independent] : feeding [Some assistance needed] : walking [Full assistance needed] : managing finances [Smoke Detector] : smoke detector [Seat Belt] :  uses seat belt [Discussed at today's visit] : Advance Directives Discussed at today's visit [Designated Healthcare Proxy] : Designated healthcare proxy [Name: ___] : Health Care Proxy's Name: [unfilled]  [Relationship: ___] : Relationship: [unfilled] [Change in mental status noted] : Change in mental status noted [Access to Medications] : access to medications [Behavioral] : behavioral [Reports changes in vision] : Reports changes in vision [Behavior] : difficulty with behavior [] : No [de-identified] : 3 cig/day [Reports changes in hearing] : Reports no changes in hearing [Reports changes in dental health] : Reports no changes in dental health [HIVDate] : 2018 [de-identified] : since 2003 [de-identified] : blurred vision

## 2018-06-14 NOTE — COUNSELING
[Weight management counseling provided] : Weight management [Healthy eating counseling provided] : healthy eating [Activity counseling provided] : activity [Behavioral health counseling provided] : behavioral health  [Patient Non-adherent to care plan] : Patient non-adherent to care plan [Limited decision making ability] : Limited decision making ability [Patient motivation] : Patient motivation [Needs reinforcement, provided] : Patient needs reinforcement on understanding lifestyle changes and  the steps needed to achieve self management goals and reinforcement was provided

## 2018-06-14 NOTE — ASSESSMENT
[FreeTextEntry1] : 48 y/o M, non compliant w/;\par \par -[facility] Stroke: neurology f/up. On ASA and atorvastatin.\par \par -HTN: better. On amlodipine and metoprolol.\par \par -Anxiety: Start lexapro 10mg daily.\par \par -IDDM: On Lantus 30units and RISS. I advise to increase lantus to 35 untis. Sliding scale provided.\par \par -Diabetic Neuropathy: on gabapentin.\par \par -Chronic pain: Pt wa sd/c from [facility] on Percocet. I advise no regular Rx will be continue here. I Rx 10 tabs.\par \par -Neurology, Endocrinology, Ophthalmology, Podiatry and Cardiology referral.\par \par -Complete blood tests today w/ uirne.

## 2018-06-14 NOTE — HISTORY OF PRESENT ILLNESS
[Post-hospitalization from ___ Hospital] : Post-hospitalization from [unfilled] Hospital [Discharge Summary] : discharge summary [Pertinent Labs] : pertinent labs [Radiology Findings] : radiology findings [Discharge Med List] : discharge medication list [Med Reconciliation] : medication reconciliation has been completed [Admitted on: ___] : The patient was admitted on [unfilled] [Discharged on ___] : discharged on [unfilled] [FreeTextEntry3] : presents today with partner, colleen and mother. [FreeTextEntry2] : 48 yo M w/ hx DM2 on insulin, smoker non compliant admitted to [facility] for acute onset headache \par (frontal, throbbing, no photophobia, no previous episodes of similar intensity, \par no alleviating factors). \par recently discharged  (5 days ago) from [facility] for acute CVA after \par noting gait disturbance and speech difficulties. underwent echo/carotids and CT \par head.  no MRI given metal fragment in lip. \par Also complaining of pins/needles of b/l feets (chronic). \par \par CT head done in ER showing acute/subacute CVA. patient neurologically intact. \par seen by neurology, continued on home medications and tylenol given for \par headaches. \par stable for discharge home. \par \par nonfocal neuro. \par

## 2018-06-18 LAB
ALBUMIN SERPL ELPH-MCNC: 4 G/DL
ALP BLD-CCNC: 99 U/L
ALT SERPL-CCNC: 20 U/L
ANION GAP SERPL CALC-SCNC: 17 MMOL/L
AST SERPL-CCNC: 18 U/L
BASOPHILS # BLD AUTO: 0.03 K/UL
BASOPHILS NFR BLD AUTO: 0.2 %
BILIRUB SERPL-MCNC: 0.2 MG/DL
BUN SERPL-MCNC: 17 MG/DL
CALCIUM SERPL-MCNC: 10.1 MG/DL
CHLORIDE SERPL-SCNC: 99 MMOL/L
CHOLEST SERPL-MCNC: 99 MG/DL
CHOLEST/HDLC SERPL: 3.2 RATIO
CO2 SERPL-SCNC: 22 MMOL/L
CREAT SERPL-MCNC: 1.02 MG/DL
CREAT SPEC-SCNC: 239 MG/DL
EOSINOPHIL # BLD AUTO: 0.25 K/UL
EOSINOPHIL NFR BLD AUTO: 2 %
FOLATE SERPL-MCNC: 19.6 NG/ML
GLUCOSE SERPL-MCNC: 218 MG/DL
HCT VFR BLD CALC: 42.8 %
HDLC SERPL-MCNC: 31 MG/DL
HGB BLD-MCNC: 13.7 G/DL
IMM GRANULOCYTES NFR BLD AUTO: 0.2 %
LDLC SERPL CALC-MCNC: 13 MG/DL
LYMPHOCYTES # BLD AUTO: 3.19 K/UL
LYMPHOCYTES NFR BLD AUTO: 26.1 %
MAN DIFF?: NORMAL
MCHC RBC-ENTMCNC: 29.1 PG
MCHC RBC-ENTMCNC: 32 GM/DL
MCV RBC AUTO: 90.9 FL
MICROALBUMIN 24H UR DL<=1MG/L-MCNC: 72.6 MG/DL
MICROALBUMIN/CREAT 24H UR-RTO: 304 MG/G
MONOCYTES # BLD AUTO: 1.26 K/UL
MONOCYTES NFR BLD AUTO: 10.3 %
NEUTROPHILS # BLD AUTO: 7.49 K/UL
NEUTROPHILS NFR BLD AUTO: 61.2 %
PLATELET # BLD AUTO: 283 K/UL
POTASSIUM SERPL-SCNC: 4.1 MMOL/L
PROT SERPL-MCNC: 7.7 G/DL
RBC # BLD: 4.71 M/UL
RBC # FLD: 14.1 %
SODIUM SERPL-SCNC: 138 MMOL/L
TRIGL SERPL-MCNC: 276 MG/DL
TSH SERPL-ACNC: 2.57 UIU/ML
VIT B12 SERPL-MCNC: 409 PG/ML
WBC # FLD AUTO: 12.24 K/UL

## 2018-06-20 LAB — COMPREHENSIVE SCREEN URINE: NORMAL

## 2018-06-29 ENCOUNTER — RX RENEWAL (OUTPATIENT)
Age: 48
End: 2018-06-29

## 2018-08-06 ENCOUNTER — RX RENEWAL (OUTPATIENT)
Age: 48
End: 2018-08-06

## 2018-10-10 ENCOUNTER — APPOINTMENT (OUTPATIENT)
Dept: FAMILY MEDICINE | Facility: CLINIC | Age: 48
End: 2018-10-10

## 2018-10-26 ENCOUNTER — APPOINTMENT (OUTPATIENT)
Dept: FAMILY MEDICINE | Facility: CLINIC | Age: 48
End: 2018-10-26
Payer: MEDICAID

## 2018-10-26 VITALS
WEIGHT: 259 LBS | SYSTOLIC BLOOD PRESSURE: 194 MMHG | BODY MASS INDEX: 33.24 KG/M2 | HEIGHT: 74 IN | HEART RATE: 98 BPM | DIASTOLIC BLOOD PRESSURE: 118 MMHG | OXYGEN SATURATION: 98 % | TEMPERATURE: 98.6 F

## 2018-10-26 VITALS — SYSTOLIC BLOOD PRESSURE: 160 MMHG | DIASTOLIC BLOOD PRESSURE: 88 MMHG

## 2018-10-26 DIAGNOSIS — N52.9 MALE ERECTILE DYSFUNCTION, UNSPECIFIED: ICD-10-CM

## 2018-10-26 DIAGNOSIS — Z86.73 PERSONAL HISTORY OF TRANSIENT ISCHEMIC ATTACK (TIA), AND CEREBRAL INFARCTION W/OUT RESIDUAL DEFICITS: ICD-10-CM

## 2018-10-26 DIAGNOSIS — F41.9 ANXIETY DISORDER, UNSPECIFIED: ICD-10-CM

## 2018-10-26 DIAGNOSIS — E66.9 OBESITY, UNSPECIFIED: ICD-10-CM

## 2018-10-26 DIAGNOSIS — I10 ESSENTIAL (PRIMARY) HYPERTENSION: ICD-10-CM

## 2018-10-26 PROCEDURE — 83036 HEMOGLOBIN GLYCOSYLATED A1C: CPT | Mod: QW

## 2018-10-26 PROCEDURE — 99215 OFFICE O/P EST HI 40 MIN: CPT | Mod: 25

## 2018-10-26 RX ORDER — BLOOD SUGAR DIAGNOSTIC
STRIP MISCELLANEOUS DAILY
Qty: 100 | Refills: 1 | Status: ACTIVE | COMMUNITY
Start: 2018-10-26 | End: 1900-01-01

## 2018-10-26 RX ORDER — INSULIN GLARGINE 100 [IU]/ML
100 INJECTION, SOLUTION SUBCUTANEOUS
Qty: 1 | Refills: 3 | Status: DISCONTINUED | COMMUNITY
Start: 2018-06-14 | End: 2018-10-26

## 2018-10-26 RX ORDER — OXYCODONE AND ACETAMINOPHEN 5; 325 MG/1; MG/1
5-325 TABLET ORAL
Qty: 10 | Refills: 0 | Status: DISCONTINUED | COMMUNITY
Start: 2018-06-14 | End: 2018-10-26

## 2018-10-26 RX ORDER — SYRINGE AND NEEDLE,INSULIN,1ML 31 GX5/16"
W/DEVICE SYRINGE, EMPTY DISPOSABLE MISCELLANEOUS
Qty: 1 | Refills: 0 | Status: ACTIVE | COMMUNITY
Start: 2018-10-26 | End: 1900-01-01

## 2018-10-26 RX ORDER — LANCETS 28 GAUGE
EACH MISCELLANEOUS
Qty: 100 | Refills: 0 | Status: ACTIVE | COMMUNITY
Start: 2018-10-26 | End: 1900-01-01

## 2018-10-26 RX ORDER — BLOOD-GLUCOSE METER
70 EACH MISCELLANEOUS
Qty: 1 | Refills: 6 | Status: ACTIVE | COMMUNITY
Start: 2018-10-26 | End: 1900-01-01

## 2018-10-26 NOTE — ASSESSMENT
[FreeTextEntry1] : 48 y/o M, non compliant w/;\par \par -[facility] Stroke 6/2018:\par New neurology referral\par On ASA and atorvastatin.\par \par -HTN: Not well control.On amlodipine and metoprolol. Increase metoprolol to 50mg bid.\par \par -Anxiety: Increase lexapro to 20mg daily.\par \par -IDDM: Change to Toujeo 35units daily.// I advise to increase 3units q 3 days if glucose > 110 in AM\par continue humulin 20units bid\par Endocrino referral.\par \par -Diabetic Neuropathy: on gabapentin.Increase dose to 400mg tid.\par \par -Chronic pain:\par Pt was advise no narcotis will be Rx. + cocaine in last urine 6/2018.\par Ibuprofen tid prn.\par \par -Neurology, Endocrinology, Podiatry and Cardiology referral.\par \par -Ophthalmology: seen recently. Will request report.\par Pt seen by Optometrist, not ophthalmology. New referral will be given next appointment\par

## 2018-10-26 NOTE — HISTORY OF PRESENT ILLNESS
[FreeTextEntry1] : f/up\par meds refills [de-identified] : 48 y/o M, non compliant pt, presents today for f/up and meds refills.\par Pt states run out of meds 2 days ago. Not using insulin or any meds.\par Pt states now has insurance and will be able to be compliant.\par \par Pt w/ uncontrolled IDDM, HTN hospitalized at [place] 6/2018 w/ Dx: CVA.\par Not seen by neurology or any of the referrals given in last visit.\par His main concern today is Erectile dysfunction .

## 2018-10-26 NOTE — PHYSICAL EXAM
[No Acute Distress] : no acute distress [Well Nourished] : well nourished [Well Developed] : well developed [Normal Sclera/Conjunctiva] : normal sclera/conjunctiva [PERRL] : pupils equal round and reactive to light [EOMI] : extraocular movements intact [Normal Outer Ear/Nose] : the outer ears and nose were normal in appearance [Normal Oropharynx] : the oropharynx was normal [No JVD] : no jugular venous distention [Supple] : supple [No Lymphadenopathy] : no lymphadenopathy [Thyroid Normal, No Nodules] : the thyroid was normal and there were no nodules present [No Respiratory Distress] : no respiratory distress  [Clear to Auscultation] : lungs were clear to auscultation bilaterally [No Accessory Muscle Use] : no accessory muscle use [Normal Rate] : normal rate  [Regular Rhythm] : with a regular rhythm [Normal S1, S2] : normal S1 and S2 [No Murmur] : no murmur heard [No Carotid Bruits] : no carotid bruits [No Abdominal Bruit] : a ~M bruit was not heard ~T in the abdomen [No Varicosities] : no varicosities [Pedal Pulses Present] : the pedal pulses are present [No Edema] : there was no peripheral edema [No Extremity Clubbing/Cyanosis] : no extremity clubbing/cyanosis [No Palpable Aorta] : no palpable aorta [Soft] : abdomen soft [Non Tender] : non-tender [Non-distended] : non-distended [No Masses] : no abdominal mass palpated [No HSM] : no HSM [Normal Bowel Sounds] : normal bowel sounds [Normal Posterior Cervical Nodes] : no posterior cervical lymphadenopathy [Normal Anterior Cervical Nodes] : no anterior cervical lymphadenopathy [No CVA Tenderness] : no CVA  tenderness [No Spinal Tenderness] : no spinal tenderness [No Joint Swelling] : no joint swelling [Grossly Normal Strength/Tone] : grossly normal strength/tone [No Rash] : no rash [Normal Gait] : normal gait [Coordination Grossly Intact] : coordination grossly intact [No Focal Deficits] : no focal deficits [Deep Tendon Reflexes (DTR)] : deep tendon reflexes were 2+ and symmetric [Normal Affect] : the affect was normal [Normal Insight/Judgement] : insight and judgment were intact [de-identified] : disheveled. poor dentition

## 2018-10-26 NOTE — COUNSELING
[Weight management counseling provided] : Weight management [Healthy eating counseling provided] : healthy eating [Activity counseling provided] : activity [Smoking cessation counseling provided] : smoking cessation [Behavioral health counseling provided] : behavioral health  [Patient motivation] : Patient motivation [Needs reinforcement, provided] : Patient needs reinforcement on understanding lifestyle changes and  the steps needed to achieve self management goals and reinforcement was provided

## 2018-10-26 NOTE — HEALTH RISK ASSESSMENT
[No falls in past year] : Patient reported no falls in the past year [] : No [de-identified] : 1 pp 3 days

## 2019-01-18 ENCOUNTER — APPOINTMENT (OUTPATIENT)
Dept: FAMILY MEDICINE | Facility: CLINIC | Age: 49
End: 2019-01-18
Payer: MEDICAID

## 2019-01-18 VITALS
HEIGHT: 74 IN | DIASTOLIC BLOOD PRESSURE: 112 MMHG | TEMPERATURE: 98 F | SYSTOLIC BLOOD PRESSURE: 165 MMHG | HEART RATE: 89 BPM | WEIGHT: 250 LBS | BODY MASS INDEX: 32.08 KG/M2 | OXYGEN SATURATION: 97 %

## 2019-01-18 DIAGNOSIS — E11.40 TYPE 2 DIABETES MELLITUS WITH DIABETIC NEUROPATHY, UNSPECIFIED: ICD-10-CM

## 2019-01-18 DIAGNOSIS — G47.00 INSOMNIA, UNSPECIFIED: ICD-10-CM

## 2019-01-18 DIAGNOSIS — F17.200 NICOTINE DEPENDENCE, UNSPECIFIED, UNCOMPLICATED: ICD-10-CM

## 2019-01-18 DIAGNOSIS — E78.1 PURE HYPERGLYCERIDEMIA: ICD-10-CM

## 2019-01-18 LAB — HBA1C MFR BLD HPLC: 12.3

## 2019-01-18 PROCEDURE — 99214 OFFICE O/P EST MOD 30 MIN: CPT | Mod: 25

## 2019-01-18 PROCEDURE — 83036 HEMOGLOBIN GLYCOSYLATED A1C: CPT | Mod: QW

## 2019-01-18 NOTE — HISTORY OF PRESENT ILLNESS
[Other: _____] : [unfilled] [FreeTextEntry1] : f/up\par Not feeling well\par Insomnia [de-identified] : 46 y/o M, non compliant pt, presents today for f/up and meds refills.\par Pt reports slurred speech, and dificulty swallowing pills. States has been unstable to walk. Seen by neurology, and was advise to have MRI and swallow Study.\par \par Pt w/ uncontrolled IDDM, HTN hospitalized at [facility] 6/2018 w/ Dx: CVA.\par

## 2019-01-18 NOTE — PHYSICAL EXAM
[No Acute Distress] : no acute distress [Well Nourished] : well nourished [Well Developed] : well developed [Normal Sclera/Conjunctiva] : normal sclera/conjunctiva [PERRL] : pupils equal round and reactive to light [EOMI] : extraocular movements intact [Normal Outer Ear/Nose] : the outer ears and nose were normal in appearance [Normal Oropharynx] : the oropharynx was normal [No JVD] : no jugular venous distention [Supple] : supple [No Lymphadenopathy] : no lymphadenopathy [Thyroid Normal, No Nodules] : the thyroid was normal and there were no nodules present [No Respiratory Distress] : no respiratory distress  [Clear to Auscultation] : lungs were clear to auscultation bilaterally [No Accessory Muscle Use] : no accessory muscle use [Normal Rate] : normal rate  [Regular Rhythm] : with a regular rhythm [Normal S1, S2] : normal S1 and S2 [No Murmur] : no murmur heard [No Carotid Bruits] : no carotid bruits [No Abdominal Bruit] : a ~M bruit was not heard ~T in the abdomen [No Varicosities] : no varicosities [Pedal Pulses Present] : the pedal pulses are present [No Edema] : there was no peripheral edema [No Extremity Clubbing/Cyanosis] : no extremity clubbing/cyanosis [No Palpable Aorta] : no palpable aorta [Soft] : abdomen soft [Non Tender] : non-tender [Non-distended] : non-distended [No Masses] : no abdominal mass palpated [No HSM] : no HSM [Normal Bowel Sounds] : normal bowel sounds [Normal Posterior Cervical Nodes] : no posterior cervical lymphadenopathy [Normal Anterior Cervical Nodes] : no anterior cervical lymphadenopathy [No CVA Tenderness] : no CVA  tenderness [No Spinal Tenderness] : no spinal tenderness [No Joint Swelling] : no joint swelling [Grossly Normal Strength/Tone] : grossly normal strength/tone [No Rash] : no rash [Normal Gait] : normal gait [Coordination Grossly Intact] : coordination grossly intact [No Focal Deficits] : no focal deficits [Deep Tendon Reflexes (DTR)] : deep tendon reflexes were 2+ and symmetric [Normal Affect] : the affect was normal [Normal Insight/Judgement] : insight and judgment were intact [de-identified] : disheveled. poor dentition

## 2019-01-18 NOTE — HEALTH RISK ASSESSMENT
[No falls in past year] : Patient reported no falls in the past year [] : No [de-identified] : 1 pp 3 days. Now 2-3 cig/day

## 2019-01-18 NOTE — ASSESSMENT
[FreeTextEntry1] : 48 y/o M, non compliant w/;\par \par -[facility] Stroke 6/2018:\par Seen by neurology. Message was left to neurology in order to coordinate tests requested.\par Pending new MRI/ Swallow study.\par On ASA and atorvastatin.\par \par -HTN: Not well control.\par On amlodipine 10 mg  and metoprolol to 50mg bid.\par Start Losartan/HCTZ 100mg-25mg.\par \par -Anxiety: \par on lexapro to 20mg daily.\par \par -Insomnia:\par Start Doxepin 25mg qhs\par -IDDM: HbA1c: 12.3%\par On Toujeo 35units daily.// \par continue humulin 20units bid\par Endocrino referral given several times in the past.\par Patient coordination center was call w/ pt to coordinate appointment.\par \par -Diabetic Neuropathy: on gabapentin 400mg tid.\par \par -Chronic pain:\par Pt was advise no narcotis will be Rx. + cocaine in last urine 6/2018.\par Ibuprofen tid prn.\par \par -Neurology, Endocrinology, Podiatry and Cardiology referral given in the past: Non compliant.\par \par -Ophthalmology: seen recently. Will request report.\par Pt seen by Optometrist, not ophthalmology. New referral will be given next appointment\par

## 2019-01-22 ENCOUNTER — TRANSCRIPTION ENCOUNTER (OUTPATIENT)
Age: 49
End: 2019-01-22

## 2019-02-11 ENCOUNTER — RX RENEWAL (OUTPATIENT)
Age: 49
End: 2019-02-11

## 2019-02-11 RX ORDER — DOXEPIN HYDROCHLORIDE 25 MG/1
25 CAPSULE ORAL
Qty: 30 | Refills: 0 | Status: DISCONTINUED | COMMUNITY
Start: 2019-01-18 | End: 2019-02-11

## 2019-02-16 ENCOUNTER — APPOINTMENT (OUTPATIENT)
Dept: MRI IMAGING | Facility: CLINIC | Age: 49
End: 2019-02-16

## 2019-02-25 ENCOUNTER — RX RENEWAL (OUTPATIENT)
Age: 49
End: 2019-02-25

## 2019-03-01 ENCOUNTER — RX RENEWAL (OUTPATIENT)
Age: 49
End: 2019-03-01

## 2019-03-07 ENCOUNTER — RX RENEWAL (OUTPATIENT)
Age: 49
End: 2019-03-07

## 2019-03-07 RX ORDER — INSULIN GLARGINE 300 U/ML
300 INJECTION, SOLUTION SUBCUTANEOUS
Qty: 2 | Refills: 2 | Status: DISCONTINUED | COMMUNITY
Start: 2018-10-26 | End: 2019-03-07

## 2019-04-02 ENCOUNTER — RX RENEWAL (OUTPATIENT)
Age: 49
End: 2019-04-02

## 2019-07-23 ENCOUNTER — INPATIENT (INPATIENT)
Facility: HOSPITAL | Age: 49
LOS: 3 days | Discharge: HOME | DRG: 42 | End: 2019-07-27
Attending: HOSPITALIST | Admitting: STUDENT IN AN ORGANIZED HEALTH CARE EDUCATION/TRAINING PROGRAM
Payer: MEDICARE

## 2019-07-23 VITALS
DIASTOLIC BLOOD PRESSURE: 104 MMHG | SYSTOLIC BLOOD PRESSURE: 161 MMHG | HEIGHT: 74 IN | HEART RATE: 91 BPM | OXYGEN SATURATION: 98 % | WEIGHT: 240.08 LBS | TEMPERATURE: 98 F | RESPIRATION RATE: 18 BRPM

## 2019-07-23 LAB
ABO RH CONFIRMATION: SIGNIFICANT CHANGE UP
ALBUMIN SERPL ELPH-MCNC: 4.1 G/DL — SIGNIFICANT CHANGE UP (ref 3.3–5.2)
ALP SERPL-CCNC: 82 U/L — SIGNIFICANT CHANGE UP (ref 40–120)
ALT FLD-CCNC: 10 U/L — SIGNIFICANT CHANGE UP
ANION GAP SERPL CALC-SCNC: 13 MMOL/L — SIGNIFICANT CHANGE UP (ref 5–17)
APTT BLD: 36.7 SEC — HIGH (ref 27.5–36.3)
AST SERPL-CCNC: 13 U/L — SIGNIFICANT CHANGE UP
BASOPHILS # BLD AUTO: 0.03 K/UL — SIGNIFICANT CHANGE UP (ref 0–0.2)
BASOPHILS NFR BLD AUTO: 0.3 % — SIGNIFICANT CHANGE UP (ref 0–2)
BILIRUB SERPL-MCNC: 0.3 MG/DL — LOW (ref 0.4–2)
BUN SERPL-MCNC: 9 MG/DL — SIGNIFICANT CHANGE UP (ref 8–20)
CALCIUM SERPL-MCNC: 9.7 MG/DL — SIGNIFICANT CHANGE UP (ref 8.6–10.2)
CHLORIDE SERPL-SCNC: 103 MMOL/L — SIGNIFICANT CHANGE UP (ref 98–107)
CO2 SERPL-SCNC: 23 MMOL/L — SIGNIFICANT CHANGE UP (ref 22–29)
CREAT SERPL-MCNC: 1.16 MG/DL — SIGNIFICANT CHANGE UP (ref 0.5–1.3)
EOSINOPHIL # BLD AUTO: 0.2 K/UL — SIGNIFICANT CHANGE UP (ref 0–0.5)
EOSINOPHIL NFR BLD AUTO: 2 % — SIGNIFICANT CHANGE UP (ref 0–6)
GLUCOSE BLDC GLUCOMTR-MCNC: 166 MG/DL — HIGH (ref 70–99)
GLUCOSE SERPL-MCNC: 174 MG/DL — HIGH (ref 70–115)
HBA1C BLD-MCNC: 9.3 % — HIGH (ref 4–5.6)
HCT VFR BLD CALC: 39.7 % — SIGNIFICANT CHANGE UP (ref 39–50)
HGB BLD-MCNC: 13.1 G/DL — SIGNIFICANT CHANGE UP (ref 13–17)
IMM GRANULOCYTES NFR BLD AUTO: 0.2 % — SIGNIFICANT CHANGE UP (ref 0–1.5)
INR BLD: 1.04 RATIO — SIGNIFICANT CHANGE UP (ref 0.88–1.16)
LYMPHOCYTES # BLD AUTO: 2.44 K/UL — SIGNIFICANT CHANGE UP (ref 1–3.3)
LYMPHOCYTES # BLD AUTO: 24.1 % — SIGNIFICANT CHANGE UP (ref 13–44)
MCHC RBC-ENTMCNC: 29.9 PG — SIGNIFICANT CHANGE UP (ref 27–34)
MCHC RBC-ENTMCNC: 33 GM/DL — SIGNIFICANT CHANGE UP (ref 32–36)
MCV RBC AUTO: 90.6 FL — SIGNIFICANT CHANGE UP (ref 80–100)
MONOCYTES # BLD AUTO: 0.91 K/UL — HIGH (ref 0–0.9)
MONOCYTES NFR BLD AUTO: 9 % — SIGNIFICANT CHANGE UP (ref 2–14)
NEUTROPHILS # BLD AUTO: 6.53 K/UL — SIGNIFICANT CHANGE UP (ref 1.8–7.4)
NEUTROPHILS NFR BLD AUTO: 64.4 % — SIGNIFICANT CHANGE UP (ref 43–77)
PLATELET # BLD AUTO: 292 K/UL — SIGNIFICANT CHANGE UP (ref 150–400)
POTASSIUM SERPL-MCNC: 3.5 MMOL/L — SIGNIFICANT CHANGE UP (ref 3.5–5.3)
POTASSIUM SERPL-SCNC: 3.5 MMOL/L — SIGNIFICANT CHANGE UP (ref 3.5–5.3)
PROT SERPL-MCNC: 7.7 G/DL — SIGNIFICANT CHANGE UP (ref 6.6–8.7)
PROTHROM AB SERPL-ACNC: 12 SEC — SIGNIFICANT CHANGE UP (ref 10–12.9)
RBC # BLD: 4.38 M/UL — SIGNIFICANT CHANGE UP (ref 4.2–5.8)
RBC # FLD: 13.2 % — SIGNIFICANT CHANGE UP (ref 10.3–14.5)
SODIUM SERPL-SCNC: 139 MMOL/L — SIGNIFICANT CHANGE UP (ref 135–145)
WBC # BLD: 10.13 K/UL — SIGNIFICANT CHANGE UP (ref 3.8–10.5)
WBC # FLD AUTO: 10.13 K/UL — SIGNIFICANT CHANGE UP (ref 3.8–10.5)

## 2019-07-23 PROCEDURE — 93010 ELECTROCARDIOGRAM REPORT: CPT

## 2019-07-23 PROCEDURE — 71045 X-RAY EXAM CHEST 1 VIEW: CPT | Mod: 26

## 2019-07-23 PROCEDURE — 99285 EMERGENCY DEPT VISIT HI MDM: CPT

## 2019-07-23 PROCEDURE — 70450 CT HEAD/BRAIN W/O DYE: CPT | Mod: 26

## 2019-07-23 RX ORDER — ATORVASTATIN CALCIUM 80 MG/1
40 TABLET, FILM COATED ORAL AT BEDTIME
Refills: 0 | Status: DISCONTINUED | OUTPATIENT
Start: 2019-07-23 | End: 2019-07-27

## 2019-07-23 RX ORDER — METOPROLOL TARTRATE 50 MG
50 TABLET ORAL
Refills: 0 | Status: DISCONTINUED | OUTPATIENT
Start: 2019-07-23 | End: 2019-07-27

## 2019-07-23 RX ORDER — INSULIN LISPRO 100/ML
VIAL (ML) SUBCUTANEOUS
Refills: 0 | Status: DISCONTINUED | OUTPATIENT
Start: 2019-07-23 | End: 2019-07-27

## 2019-07-23 RX ORDER — DEXTROSE 50 % IN WATER 50 %
25 SYRINGE (ML) INTRAVENOUS ONCE
Refills: 0 | Status: DISCONTINUED | OUTPATIENT
Start: 2019-07-23 | End: 2019-07-27

## 2019-07-23 RX ORDER — OXYCODONE AND ACETAMINOPHEN 5; 325 MG/1; MG/1
1 TABLET ORAL DAILY
Refills: 0 | Status: DISCONTINUED | OUTPATIENT
Start: 2019-07-23 | End: 2019-07-24

## 2019-07-23 RX ORDER — LOSARTAN POTASSIUM 100 MG/1
100 TABLET, FILM COATED ORAL DAILY
Refills: 0 | Status: DISCONTINUED | OUTPATIENT
Start: 2019-07-23 | End: 2019-07-27

## 2019-07-23 RX ORDER — HYDROCHLOROTHIAZIDE 25 MG
25 TABLET ORAL DAILY
Refills: 0 | Status: DISCONTINUED | OUTPATIENT
Start: 2019-07-23 | End: 2019-07-27

## 2019-07-23 RX ORDER — GABAPENTIN 400 MG/1
400 CAPSULE ORAL THREE TIMES A DAY
Refills: 0 | Status: DISCONTINUED | OUTPATIENT
Start: 2019-07-23 | End: 2019-07-27

## 2019-07-23 RX ORDER — SODIUM CHLORIDE 9 MG/ML
1000 INJECTION INTRAMUSCULAR; INTRAVENOUS; SUBCUTANEOUS
Refills: 0 | Status: DISCONTINUED | OUTPATIENT
Start: 2019-07-23 | End: 2019-07-25

## 2019-07-23 RX ORDER — GLUCAGON INJECTION, SOLUTION 0.5 MG/.1ML
1 INJECTION, SOLUTION SUBCUTANEOUS ONCE
Refills: 0 | Status: DISCONTINUED | OUTPATIENT
Start: 2019-07-23 | End: 2019-07-27

## 2019-07-23 RX ORDER — SODIUM CHLORIDE 9 MG/ML
1000 INJECTION, SOLUTION INTRAVENOUS
Refills: 0 | Status: DISCONTINUED | OUTPATIENT
Start: 2019-07-23 | End: 2019-07-27

## 2019-07-23 RX ORDER — AMLODIPINE BESYLATE 2.5 MG/1
10 TABLET ORAL DAILY
Refills: 0 | Status: DISCONTINUED | OUTPATIENT
Start: 2019-07-23 | End: 2019-07-27

## 2019-07-23 RX ORDER — ASPIRIN/CALCIUM CARB/MAGNESIUM 324 MG
325 TABLET ORAL DAILY
Refills: 0 | Status: DISCONTINUED | OUTPATIENT
Start: 2019-07-23 | End: 2019-07-26

## 2019-07-23 RX ORDER — DEXTROSE 50 % IN WATER 50 %
15 SYRINGE (ML) INTRAVENOUS ONCE
Refills: 0 | Status: DISCONTINUED | OUTPATIENT
Start: 2019-07-23 | End: 2019-07-27

## 2019-07-23 RX ORDER — ESCITALOPRAM OXALATE 10 MG/1
20 TABLET, FILM COATED ORAL DAILY
Refills: 0 | Status: DISCONTINUED | OUTPATIENT
Start: 2019-07-23 | End: 2019-07-27

## 2019-07-23 RX ORDER — DEXTROSE 50 % IN WATER 50 %
12.5 SYRINGE (ML) INTRAVENOUS ONCE
Refills: 0 | Status: DISCONTINUED | OUTPATIENT
Start: 2019-07-23 | End: 2019-07-27

## 2019-07-23 RX ORDER — METFORMIN HYDROCHLORIDE 850 MG/1
850 TABLET ORAL
Refills: 0 | Status: DISCONTINUED | OUTPATIENT
Start: 2019-07-23 | End: 2019-07-24

## 2019-07-23 RX ADMIN — GABAPENTIN 400 MILLIGRAM(S): 400 CAPSULE ORAL at 20:52

## 2019-07-23 RX ADMIN — Medication 50 MILLIGRAM(S): at 20:52

## 2019-07-23 RX ADMIN — LOSARTAN POTASSIUM 100 MILLIGRAM(S): 100 TABLET, FILM COATED ORAL at 20:46

## 2019-07-23 RX ADMIN — ATORVASTATIN CALCIUM 40 MILLIGRAM(S): 80 TABLET, FILM COATED ORAL at 20:52

## 2019-07-23 RX ADMIN — OXYCODONE AND ACETAMINOPHEN 1 TABLET(S): 5; 325 TABLET ORAL at 20:46

## 2019-07-23 RX ADMIN — SODIUM CHLORIDE 125 MILLILITER(S): 9 INJECTION INTRAMUSCULAR; INTRAVENOUS; SUBCUTANEOUS at 17:38

## 2019-07-23 RX ADMIN — ESCITALOPRAM OXALATE 20 MILLIGRAM(S): 10 TABLET, FILM COATED ORAL at 20:46

## 2019-07-23 RX ADMIN — Medication 2: at 20:45

## 2019-07-23 RX ADMIN — SODIUM CHLORIDE 1000 MILLILITER(S): 9 INJECTION INTRAMUSCULAR; INTRAVENOUS; SUBCUTANEOUS at 18:23

## 2019-07-23 RX ADMIN — AMLODIPINE BESYLATE 10 MILLIGRAM(S): 2.5 TABLET ORAL at 20:52

## 2019-07-23 RX ADMIN — METFORMIN HYDROCHLORIDE 850 MILLIGRAM(S): 850 TABLET ORAL at 20:46

## 2019-07-23 NOTE — ED PROVIDER NOTE - CLINICAL SUMMARY MEDICAL DECISION MAKING FREE TEXT BOX
48 year old with hx of CVA reporting slurred speech NIH score of 1 patient well appearing.  CT negative for acute infarct.  Patient admitted to observation for rule out TIA.

## 2019-07-23 NOTE — ED STATDOCS - PROGRESS NOTE DETAILS
49 y/o M pt with PMHx CVA x2, HTN, DM presents to the ED c/o slurred speech beginning 2:00am today.  Pt went to sleep at 2:00am this morning, thinks he might have had slurred speech at the time, pt then slept, and woke up at 13:00 today.  Relative states at 13:00 when pt woke up he did not look well, had slurred speech, was c/o HA, his face appeared different, and pt was confused.  Relative states pt fell multiple times since waking up at 13:00, and continues to be confused.  Per relative, pt has been c/o generalized "not feeling well" and increased fatigue for the past several days.  Pt had 2 strokes last year, came into the ED for evaluation at the time, did not need tPa either time,.  No blood thinners.  Denies CP.  No further acute complaints at this time.   Pt will be sent to the Main ED for further treatment and evaluation. Initial orders placed.

## 2019-07-23 NOTE — ED ADULT NURSE NOTE - CHPI ED NUR SYMPTOMS NEG
no confusion/no weakness/no loss of consciousness/no nausea/no numbness/no blurred vision/no dizziness

## 2019-07-23 NOTE — ED ADULT NURSE NOTE - OBJECTIVE STATEMENT
Assumed pt care at 1630.  pt awake, alert and oriented x3 c/o slurred speech onset today.  As per pt and pts fiance at bedside states pt has not been "normal" for over a week now.  Unable to recall exactly when slurred speech began.  As per pt and pts fiance, pt has been feeling progressively "unwell" and with unsteady gait all weekend into today but attributed the s/s to the heat.  Pt with hx of 2 strokes, denies any blood thinners.  Unable to recall medication list at this time.  Speech slightly slurred but as per family, speech has greatly improved since onset today.  no other neuro deficits noted.  pt notes 7/10 headache at this time.

## 2019-07-23 NOTE — ED ADULT NURSE REASSESSMENT NOTE - NIH STROKE SCALE: 9. BEST LANGUAGE, QM
(1) Mild-to-moderate aphasia; some obvious loss of fluency or facility of comprehension, w/o significant limitation on ideas expressed or form of expression. Reduction of speech and/or comprehension, however, makes conversation about provided material difficult or impossible.  For example, in conversation about provided materials, examiner can identify picture or naming card content from patient's response. (0) No aphasia; normal

## 2019-07-23 NOTE — ED PROVIDER NOTE - OBJECTIVE STATEMENT
This patient is a 48 year old man hx of CVA, HTN, and DM who presents to the ER with his significant other reporting slurred speech.  Patient states that he woke up around 2am feeling "off" but he did not wake up his fiance but went back to sleep.  His fiance woke up around 1:30 pm and noticed his face to look "different" and the patient had slurred speech.  Patient denies vision loss, headache, as well as motor or sensory weakness.

## 2019-07-23 NOTE — ED ADULT NURSE REASSESSMENT NOTE - NS ED NURSE REASSESS COMMENT FT1
assumed care of pt @ 2230, report received from liban VIDAL RN, charting as noted. pt AOx4 c/o slurred speech. per pt's spouse at the bedside, pt's slurred speech has improved, but still not at baseline. no facial droop noted, steady gait, no drift, no loss in sensation or strength bilaterally in upper and lower extremities. NIH score 1, GCS 15. HR is NSR on cardiac monitor, lung sounds are clear b/l, abd is soft and nontender with positive bowel sounds in all four quadrants, skin is warm, dry and appropriate for age and race. pt educated on plan of care and observation stay. Plan of care taught back to RN. Proficiency determined from successful pt teach back. Pt oriented to unit, staff, and room. Pt reeducated on call bell use. Bed locked in lowest position, call bell within reach. All questions and concerns addressed.     2nd serial troponin collected and sent to lab. per PA ok to reschedule 3rd trop to 0300. awaiting MRI head in AM.

## 2019-07-23 NOTE — ED ADULT NURSE NOTE - INTERVENTIONS DEFINITIONS
[place] to call system/Call bell, personal items and telephone within reach/Instruct patient to call for assistance/Monitor gait and stability/Physically safe environment: no spills, clutter or unnecessary equipment/Stretcher in lowest position, wheels locked, appropriate side rails in place

## 2019-07-24 ENCOUNTER — TRANSCRIPTION ENCOUNTER (OUTPATIENT)
Age: 49
End: 2019-07-24

## 2019-07-24 DIAGNOSIS — R47.81 SLURRED SPEECH: ICD-10-CM

## 2019-07-24 LAB
CHOLEST SERPL-MCNC: 183 MG/DL — SIGNIFICANT CHANGE UP (ref 110–199)
GLUCOSE BLDC GLUCOMTR-MCNC: 143 MG/DL — HIGH (ref 70–99)
GLUCOSE BLDC GLUCOMTR-MCNC: 177 MG/DL — HIGH (ref 70–99)
GLUCOSE BLDC GLUCOMTR-MCNC: 204 MG/DL — HIGH (ref 70–99)
GLUCOSE BLDC GLUCOMTR-MCNC: 204 MG/DL — HIGH (ref 70–99)
HDLC SERPL-MCNC: 24 MG/DL — LOW
LIPID PNL WITH DIRECT LDL SERPL: 106 MG/DL — SIGNIFICANT CHANGE UP
RAPID RVP RESULT: SIGNIFICANT CHANGE UP
TOTAL CHOLESTEROL/HDL RATIO MEASUREMENT: 8 RATIO — SIGNIFICANT CHANGE UP (ref 3.4–9.6)
TRIGL SERPL-MCNC: 266 MG/DL — HIGH (ref 10–200)
TROPONIN T SERPL-MCNC: <0.01 NG/ML — SIGNIFICANT CHANGE UP (ref 0–0.06)

## 2019-07-24 PROCEDURE — 99223 1ST HOSP IP/OBS HIGH 75: CPT

## 2019-07-24 PROCEDURE — 70150 X-RAY EXAM OF FACIAL BONES: CPT | Mod: 26

## 2019-07-24 PROCEDURE — 12345: CPT | Mod: NC

## 2019-07-24 PROCEDURE — 93880 EXTRACRANIAL BILAT STUDY: CPT | Mod: 26

## 2019-07-24 PROCEDURE — 93306 TTE W/DOPPLER COMPLETE: CPT | Mod: 26

## 2019-07-24 RX ORDER — OXYCODONE AND ACETAMINOPHEN 5; 325 MG/1; MG/1
1 TABLET ORAL EVERY 6 HOURS
Refills: 0 | Status: DISCONTINUED | OUTPATIENT
Start: 2019-07-24 | End: 2019-07-27

## 2019-07-24 RX ORDER — NICOTINE POLACRILEX 2 MG
1 GUM BUCCAL DAILY
Refills: 0 | Status: DISCONTINUED | OUTPATIENT
Start: 2019-07-24 | End: 2019-07-27

## 2019-07-24 RX ADMIN — Medication 50 MILLIGRAM(S): at 05:35

## 2019-07-24 RX ADMIN — AMLODIPINE BESYLATE 10 MILLIGRAM(S): 2.5 TABLET ORAL at 05:34

## 2019-07-24 RX ADMIN — GABAPENTIN 400 MILLIGRAM(S): 400 CAPSULE ORAL at 14:40

## 2019-07-24 RX ADMIN — GABAPENTIN 400 MILLIGRAM(S): 400 CAPSULE ORAL at 22:35

## 2019-07-24 RX ADMIN — OXYCODONE AND ACETAMINOPHEN 1 TABLET(S): 5; 325 TABLET ORAL at 21:42

## 2019-07-24 RX ADMIN — SODIUM CHLORIDE 125 MILLILITER(S): 9 INJECTION INTRAMUSCULAR; INTRAVENOUS; SUBCUTANEOUS at 00:26

## 2019-07-24 RX ADMIN — Medication 4: at 12:14

## 2019-07-24 RX ADMIN — Medication 325 MILLIGRAM(S): at 12:45

## 2019-07-24 RX ADMIN — Medication 2: at 07:34

## 2019-07-24 RX ADMIN — OXYCODONE AND ACETAMINOPHEN 1 TABLET(S): 5; 325 TABLET ORAL at 19:40

## 2019-07-24 RX ADMIN — LOSARTAN POTASSIUM 100 MILLIGRAM(S): 100 TABLET, FILM COATED ORAL at 05:35

## 2019-07-24 RX ADMIN — OXYCODONE AND ACETAMINOPHEN 1 TABLET(S): 5; 325 TABLET ORAL at 12:45

## 2019-07-24 RX ADMIN — Medication 25 MILLIGRAM(S): at 05:34

## 2019-07-24 RX ADMIN — OXYCODONE AND ACETAMINOPHEN 1 TABLET(S): 5; 325 TABLET ORAL at 05:54

## 2019-07-24 RX ADMIN — Medication 50 MILLIGRAM(S): at 17:56

## 2019-07-24 RX ADMIN — OXYCODONE AND ACETAMINOPHEN 1 TABLET(S): 5; 325 TABLET ORAL at 00:25

## 2019-07-24 RX ADMIN — ESCITALOPRAM OXALATE 20 MILLIGRAM(S): 10 TABLET, FILM COATED ORAL at 12:45

## 2019-07-24 RX ADMIN — OXYCODONE AND ACETAMINOPHEN 1 TABLET(S): 5; 325 TABLET ORAL at 05:33

## 2019-07-24 RX ADMIN — OXYCODONE AND ACETAMINOPHEN 1 TABLET(S): 5; 325 TABLET ORAL at 00:28

## 2019-07-24 RX ADMIN — SODIUM CHLORIDE 125 MILLILITER(S): 9 INJECTION INTRAMUSCULAR; INTRAVENOUS; SUBCUTANEOUS at 19:41

## 2019-07-24 RX ADMIN — ATORVASTATIN CALCIUM 40 MILLIGRAM(S): 80 TABLET, FILM COATED ORAL at 22:34

## 2019-07-24 RX ADMIN — GABAPENTIN 400 MILLIGRAM(S): 400 CAPSULE ORAL at 05:33

## 2019-07-24 NOTE — ED ADULT NURSE REASSESSMENT NOTE - NIH STROKE SCALE: 4. FACIAL PALSY, QM
(0) Normal symmetrical movements

## 2019-07-24 NOTE — H&P ADULT - HISTORY OF PRESENT ILLNESS
47 y/o male with PMH of CVA (twice last one last year) with no sequela, DM-2, HTN came to the ED saying "I think I have a stroke". As per patient's significant other, patient woke up at 1:30PM with slurred speech; he was frustrated because she could not understand what he was saying. Patient also have unsteady gait resulting in frequent falls (4 times) yesterday. Patient said he has been feeling "off" for the past 3 days but attributed it to the heat waves. During examination, patient still have slurred speech as per girlfriend, speech is getting better but not back to baseline. He has no HA, blurry vision, dizziness, numbness, tingling sensation, tongue bite, bowel/bladder incontinence.

## 2019-07-24 NOTE — ED ADULT NURSE REASSESSMENT NOTE - NURSING NEURO STROKE SYMPTOMS
per wife "slurred speech"/speech is not at baseline
slurred speech
abnormal gait, change from baseline, slurred speech./difficulty speaking
slurred speech/pt's speech not at baseline per pt spouse
slurred speech, abnormal gait (gait requiring assistance which is change from baseline as per pt)./difficulty speaking

## 2019-07-24 NOTE — ED ADULT NURSE REASSESSMENT NOTE - NURSING NEURO LEVEL OF CONSCIOUSNESS
alert and awake
alert and awake/follows commands
follows commands/alert and awake
alert and awake
alert and awake
follows commands/alert and awake
alert and awake

## 2019-07-24 NOTE — ED ADULT NURSE REASSESSMENT NOTE - NS ED NURSE REASSESS COMMENT FT1
MD KELLER contacted as pt is requesting pain medication, states "I am prescribed percocet for chronic pain." MD made aware and as per MD orders to be placed. Awaiting new orders at this time.

## 2019-07-24 NOTE — ED ADULT NURSE REASSESSMENT NOTE - NIH STROKE SCALE: 6B. MOTOR LEG, RIGHT, QM
(0) No drift; leg holds 30 degree position for full 5 secs

## 2019-07-24 NOTE — CONSULT NOTE ADULT - ASSESSMENT
The patient is a 48y Male with symptoms suggestive of stroke. He has multiple risk factors.     Stroke  Agree with checking echocardiogram.  Carotid doppler added to work up.  Continue antiplatelet and statin.   Mobilize with physical therapy (ordered).  OT and speech therapy ordered as well.     Hypertension   His blood pressure was poorly controlled upon arrival.   Ok to maintain slightly high for first 48 hrs.   Recommend tighter blood pressure control after that.     Diabetes Mellitus   Control blood sugar.     Tobacco use.   Patient advised to discontinue smoking.     Case discussed with Dr Santo (ER attending) last night.

## 2019-07-24 NOTE — ED ADULT NURSE REASSESSMENT NOTE - NS ED NURSE REASSESS COMMENT FT1
pt to be admitted to stroke unit. Pt being transferred back to main ED. report being given to Lizett Beth RN. pt and pt spouse agreeable with plan of care. MD moura at bedside to assess pt at this time. pt to be admitted to stroke unit. Pt being transferred back to main ED. report being given to Lizett Beth RN. NIH score remains 1, GCS 15. per pt's spouse speech is still abnormal, but improved. MARY beth at bedside with this RN to discuss and assess pt baseline. pt and pt spouse agreeable with plan of care. MD moura at bedside to assess pt at this time.

## 2019-07-24 NOTE — CONSULT NOTE ADULT - SUBJECTIVE AND OBJECTIVE BOX
[facility]                                        Neurology at [place]                                  Aubrey Yanez, & Evelio                                      [address]                                           [place], [place], [address]                                                [phone]        CC: Stroke    HISTORY:  The patient is a 48y Male who reports that he went to sleep on 7/23/19 at around 2 am and awoke around 1:30 pm with slurred speech. This has been with him ever since. It is moderately severe. It has persisted since onset. In addition he reports that his gait is somewhat unsteady. There was no associated swallowing difficulty.     PAST MEDICAL & SURGICAL HISTORY:  CVA (cerebral vascular accident): HTN  DM-2  No significant past surgical history    MEDICATION PRIOR TO ADMISSION:  Aspirin, Atorvastatin, Gabapentin, Metformin, Insulin, Metoprolol, Amlodipine, Famotidine, Percocet.     MEDICATIONS  (STANDING):  amLODIPine   Tablet 10 milliGRAM(s) Oral daily  aspirin enteric coated 325 milliGRAM(s) Oral daily  atorvastatin 40 milliGRAM(s) Oral at bedtime  dextrose 5%. 1000 milliLiter(s) (50 mL/Hr) IV Continuous <Continuous>  escitalopram 20 milliGRAM(s) Oral daily  gabapentin 400 milliGRAM(s) Oral three times a day  hydrochlorothiazide 25 milliGRAM(s) Oral daily  insulin lispro (HumaLOG) corrective regimen sliding scale   SubCutaneous three times a day before meals  losartan 100 milliGRAM(s) Oral daily  metoprolol tartrate 50 milliGRAM(s) Oral two times a day  sodium chloride 0.9%. 1000 milliLiter(s) (125 mL/Hr) IV Continuous <Continuous>    MEDICATIONS  (PRN):  dextrose 40% Gel 15 Gram(s) Oral once PRN Blood Glucose LESS THAN 70 milliGRAM(s)/deciliter  glucagon  Injectable 1 milliGRAM(s) IntraMuscular once PRN Glucose LESS THAN 70 milligrams/deciliter  oxyCODONE    5 mG/acetaminophen 325 mG 1 Tablet(s) Oral every 6 hours PRN Severe Pain (7 - 10)    Allergies  No Known Allergies    SOCIAL HISTORY:  Smokes 1/2 pack per day.  Occasional alcohol.    FAMILY HISTORY:  Mother: Hypertension, Diabetes Mellitus.  Father: Hypertension, Diabetes Mellitus.  Sister: family history unknown.  Children alive and well.     ROS:  Constitutional: The patient denies fevers or weight changes.  Neuro: As per HPI.  Eyes: Denies blurry vision.  Ears/nose/throat: Denies Tinnitus.   Cardiac: Denies chest pain. Denies palpitations.  Respiratory: Denies shortness of breath.  GI: Denies abdominal pain, nausea, or vomiting.  : Denies change in urinary pattern.  Integumentary: Denies rash.  Psych: Denies recent mood changes.  Heme: denies easy bleeding/bruising.    Exam:  Vital Signs Last 24 Hrs  T(C): 36.5 (24 Jul 2019 07:31), Max: 36.9 (24 Jul 2019 00:45)  T(F): 97.7 (24 Jul 2019 07:31), Max: 98.4 (24 Jul 2019 00:45)  HR: 64 (24 Jul 2019 07:31) (64 - 91)  BP: 142/90 (24 Jul 2019 07:31) (141/73 - 176/81)  RR: 20 (24 Jul 2019 07:31) (16 - 20)  SpO2: 97% (24 Jul 2019 07:31) (96% - 99%)  General: NAD.   Carotid bruits absent.     Mental status: The patient is awake, alert, and fully oriented. There is no aphasia. There is moderate dysarthria.     Cranial nerves: There is no papilledema. Pupils react symmetrically to light. There is no visual field deficit to confrontation. Extraocular motion is full with no nystagmus. There is no ptosis. Facial sensation is intact. Facial musculature is symmetric. Palate elevates symmetrically. Tongue is midline.    Motor: There is normal bulk and tone.  Strength is 5/5 in the right arm and leg.   Strength is 5/5 in the left arm and leg.    Sensation: Intact to light touch and pin.    Reflexes: 2+ throughout and plantar responses are flexor.    Cerebellar: There is no dysmetria on finger to nose testing.    [facility] SS:   Date: 7/24/19  Time: 9:25 am  1a) Level of consciousness (0-3): 0  1b) Questions (0-2): 0  1c) Commands (0-2): 0  2  ) Gaze (0-2): 0  3  ) Visual field (0-3): 0  4  ) Facial palsy (0-3): 0  Motor  5a) Left arm (0-4): 0  5b) Right arm (0-4): 0  6a) Left leg (0-4): 0  6b) Right leg (0-4): 0  7  ) Ataxia (0-2): 0  Sensory  8  ) Sensory (0-2): 0  Speech  9  ) Language (0-3): 0  10) Dysarthria (0-2): 1  Extinction  11) Extinction/inattention (0-2): 0    Total score: 1    LABS:                         13.1   10.13 )-----------( 292      ( 23 Jul 2019 16:22 )             39.7       07-23    139  |  103  |  9.0  ----------------------------<  174<H>  3.5   |  23.0  |  1.16    Ca    9.7      23 Jul 2019 16:22    TPro  7.7  /  Alb  4.1  /  TBili  0.3<L>  /  DBili  x   /  AST  13  /  ALT  10  /  AlkPhos  82  07-23      PT/INR - ( 23 Jul 2019 16:22 )   PT: 12.0 sec;   INR: 1.04 ratio    PTT - ( 23 Jul 2019 16:22 )  PTT:36.7 sec    RADIOLOGY   CT head images reviewed (and concur with report): There is no acute pathology.   There is a chronic lacunar infarct in the right basal ganglia.

## 2019-07-24 NOTE — ED CDU PROVIDER DISPOSITION NOTE - ATTENDING CONTRIBUTION TO CARE
pt to be admitted for stroke with continued symptoms of slurred speech and ataxia;   admit to hospitalist

## 2019-07-24 NOTE — ED ADULT NURSE REASSESSMENT NOTE - NSIMPLEMENTINTERV_GEN_ALL_ED
Implemented All Fall Risk Interventions:  [place]bert to call system. Call bell, personal items and telephone within reach. Instruct patient to call for assistance. Room bathroom lighting operational. Non-slip footwear when patient is off stretcher. Physically safe environment: no spills, clutter or unnecessary equipment. Stretcher in lowest position, wheels locked, appropriate side rails in place. Provide visual cue, wrist band, yellow gown, etc. Monitor gait and stability. Monitor for mental status changes and reorient to person, place, and time. Review medications for side effects contributing to fall risk. Reinforce activity limits and safety measures with patient and family.
Implemented All Fall Risk Interventions:  [place] to call system. Call bell, personal items and telephone within reach. Instruct patient to call for assistance. Room bathroom lighting operational. Non-slip footwear when patient is off stretcher. Physically safe environment: no spills, clutter or unnecessary equipment. Stretcher in lowest position, wheels locked, appropriate side rails in place. Provide visual cue, wrist band, yellow gown, etc. Monitor gait and stability. Monitor for mental status changes and reorient to person, place, and time. Review medications for side effects contributing to fall risk. Reinforce activity limits and safety measures with patient and family.
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
## 2019-07-24 NOTE — PHYSICAL THERAPY INITIAL EVALUATION ADULT - ADDITIONAL COMMENTS
Pt lives in an apartment with 5 stairs to enter. He lives with his significant other who is available to provide 24/7 assist. He was previously independent and walked around without AD.
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
## 2019-07-24 NOTE — ED ADULT NURSE REASSESSMENT NOTE - NURSING NEURO ORIENTATION
oriented to person, place and time

## 2019-07-24 NOTE — ED ADULT NURSE REASSESSMENT NOTE - GENERAL PATIENT STATE
comfortable appearance/cooperative/family/SO at bedside/resting/sleeping
comfortable appearance/smiling/interactive
family/SO at bedside/comfortable appearance/cooperative/resting/sleeping

## 2019-07-24 NOTE — ED ADULT NURSE REASSESSMENT NOTE - NS ED NURSE REASSESS COMMENT FT1
Handoff received from offgoing RN. Charting as noted. Pt. received AxOx4, lying in stretcher, in NSR on cardiac monitor, in NAD. Vital signs stable and as charted. Pt. complains of 8/10 back pain at this time. PRN Percocet to be given. Wife at bedside. Neuro check completed at this time. NIH score remains a 1. 20g IV in L. forearm patent. Pt. COULTER in bed. Warm blanket provided. Discussed plan of care with pt and wife. Pt and wife verbalized understanding. Handoff received from offgoing RN. Charting as noted. Pt. received AxOx4, lying in stretcher, in NSR on cardiac monitor, in NAD. Vital signs stable and as charted. Pt. awaiting 4 JORDY bed at this time (bed is dirty). Pt. complains of 8/10 back pain at this time. PRN Percocet to be given. Wife at bedside. Neuro check completed at this time. NIH score remains a 1 at this time. 20g IV in L. forearm patent. Pt. COULTER in bed. Warm blanket provided. Discussed plan of care with pt and wife. Pt and wife verbalized understanding. Side rails up x 2 for safety. Call bell within reach, commode remains at bedside. Pt. safety and comfort measures maintained.

## 2019-07-24 NOTE — SPEECH LANGUAGE PATHOLOGY EVALUATION - SLP DIAGNOSIS
Moderate speech intelligibility impacted by rate of speech, impairing articulatory precision. Cannot rule out motor speech impairment; pending further testing this date. Moderate speech intelligibility impacted by rate of speech, further impacting articulatory precision. Cannot rule out motor speech impairment; pending further testing. Moderately reduced speech intelligibility impacted by rate of speech, further impacting articulatory precision. Cannot rule out motor speech impairment; pending further testing.

## 2019-07-24 NOTE — ED CDU PROVIDER SUBSEQUENT DAY NOTE - MEDICAL DECISION MAKING DETAILS
Due to patients symptoms feel that patient would benefit for admission versus of observation, will admit to medicine

## 2019-07-24 NOTE — ED ADULT NURSE REASSESSMENT NOTE - NIH STROKE SCALE: 1B. LOC QUESTIONS, QM
(0) Answers both questions correctly
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
## 2019-07-24 NOTE — SPEECH LANGUAGE PATHOLOGY EVALUATION - SLP GENERAL OBSERVATIONS
Pt received & seen laying in bed, +awake/alert, +fully oriented, +reduced speech intelligibility, +0/10 pain, +fiance at bedside.

## 2019-07-24 NOTE — H&P ADULT - NSHPSOCIALHISTORY_GEN_ALL_CORE
Cigarette 1/2PPD since age 17; drink alcohol occasionally, no illicit drug. Lives wit significant other

## 2019-07-24 NOTE — ED ADULT NURSE REASSESSMENT NOTE - NS ED NURSE REASSESS COMMENT FT1
pt care assumed at 0730, no apparent distress noted at this time, charting as noted. pt received Alert and Oriented to person, place, situation and time resting in bed comfortably. pt denies complaints at this time. pt is Normal Sinus Rhythm on cardiac monitor. pt educated on not standing up on his own and to use call bell for assistance. pt states he will call RN when he needs to ambulate. call bell placed within reach. pt educated on plan of care, pt able to successfully teach back plan of care to RN, RN will continue to reeducate pt during hospital stay.

## 2019-07-24 NOTE — PHYSICAL THERAPY INITIAL EVALUATION ADULT - GENERAL OBSERVATIONS, REHAB EVAL
Consult received, chart reviewed. Patient received supine in bed, NAD, +monitor. Patient agreed to EVALUATION from Physical Therapist.

## 2019-07-24 NOTE — ED ADULT NURSE REASSESSMENT NOTE - NEURO GAIT
steady
requires assistance
requires assistance
unsteady/requires assistance
steady
requires assistance/unsteady
requires assistance/unsteady

## 2019-07-24 NOTE — ED ADULT NURSE REASSESSMENT NOTE - NS ED NURSE REASSESS COMMENT FT1
Pt assisted out of bed to bathroom. Pt noted to be unsteady on feet requiring assistance. Pt able to ambulate to bathroom, assisted back into stretcher safely. Side rails up, call bell within reach.

## 2019-07-24 NOTE — ED ADULT NURSE REASSESSMENT NOTE - COMFORT CARE
meal provided
plan of care explained
warm blanket provided
repositioned/side rails up/plan of care explained/po fluids offered
side rails up/darkened lights/plan of care explained
repositioned/plan of care explained/side rails up

## 2019-07-24 NOTE — ED CDU PROVIDER SUBSEQUENT DAY NOTE - ATTENDING CONTRIBUTION TO CARE
pt reexamined with difficulty walking  and continued difficulty with speech;   pt to be admitted to hospitalist service for stroke

## 2019-07-24 NOTE — ED CDU PROVIDER INITIAL DAY NOTE - ATTENDING CONTRIBUTION TO CARE
I, Dante Trejo, performed a face to face bedside interview with this patient regarding history of present illness, review of symptoms and relevant past medical, social and family history.  I completed an independent physical examination. I have communicated the patient’s plan of care and disposition with the ACP.      48 year old man hx of CVA, HTN, and DM who presents to the ER with his significant other reporting slurred speech.  Patient states that he woke up around 2am.  pt admitted to observation for mri;  pt noted speech is not at baseline;   pe awake alert in nad; heent ncat neck supple cor s1s2 chest clear abd soft neuro oriented x 3 cn 2 - 12 intact;  5/5 motor;  +dysarthria;  will admit to hospitalist service for stroke

## 2019-07-24 NOTE — ED ADULT NURSE REASSESSMENT NOTE - NIH STROKE SCALE: 5A. MOTOR ARM, LEFT, QM
(0) No drift; limb holds 90 (or 45) degrees for full 10 secs

## 2019-07-24 NOTE — SPEECH LANGUAGE PATHOLOGY EVALUATION - SLP VERBAL EXPRESSION
intact No word finding difficulties observed during evaluation, however, jin reports pt presented with stuttering prior to time of evaluation./intact intact/No word finding difficulties observed during evaluation, however, fiance reports pt presented with periods of dysfluency prior to onset of  this evaluation.

## 2019-07-24 NOTE — ED ADULT NURSE REASSESSMENT NOTE - NIH STROKE SCALE: 1C. LOC COMMANDS, QM
(0) Performs both tasks correctly

## 2019-07-24 NOTE — SPEECH LANGUAGE PATHOLOGY EVALUATION - SLP CONVERSATIONAL SPEECH
Syntax and semantics observed to be within functional limits. However, pt presents with rapid rate of speech, impacting speech intelligibility. Syntax and semantics observed to be within functional limits.

## 2019-07-24 NOTE — ED ADULT NURSE REASSESSMENT NOTE - NIH STROKE SCALE: 1A. LEVEL OF CONSCIOUSNESS, QM
(0) Alert; keenly responsive

## 2019-07-24 NOTE — ED ADULT NURSE REASSESSMENT NOTE - NS ED NURSE REASSESS COMMENT FT1
Pt is resting in bed comfortably at this time, no apparent distress noted at this time. pt safety maintained. Pt denies any complaints at this time. pt is Normal Sinus Rhythm on cardiac monitor. wife remains in recliner at bedside. pt educated on plan of care, plan of care taught back to RN. plan of care education deemed proficient through successful teach back. will continue to reeducate pt regarding plan of care.

## 2019-07-24 NOTE — ED CDU PROVIDER DISPOSITION NOTE - CLINICAL COURSE
Patient is a 47 y/o male with a pmhx of CVA, HTN, DM presents to the ED with slurred speech that occurred at 2 am. Patient states he felt "off" at home. While in observation patient's gait become ataxia, slurred speech, pt would benefit from admission, due to past medical hx and current sxs, will admit to medicine

## 2019-07-24 NOTE — ED ADULT NURSE REASSESSMENT NOTE - NS ED NURSE REASSESS COMMENT FT1
Assumed pt care from RN TANMAY. Pt lying in stretcher, on cardiac monitor and , no acute distress noted. Pt Assumed pt care from RN TANMAY. Pt lying in stretcher, on cardiac monitor and , no acute distress noted. Pt VS as noted, pt NIHSS = 1 as noted. Pt A+Ox4, offers no complaints of chest pain or shortness of breath, respirations are spontaneous even and unlabored. Pt moving all extremities without issue. Pt updated on need for admission, further testing and monitoring, educated on plan of care, able to successfully teach back plan of care to RN. RN will continue to update pt throughout ED stay.

## 2019-07-24 NOTE — SPEECH LANGUAGE PATHOLOGY EVALUATION - COMMENTS
As per MD note: "49 y/o male with PMH of CVA (twice last one last year) with no sequela, DM-2, HTN came to the ED saying "I think I have a stroke". As per patient's significant other, patient woke up at 1:30PM with slurred speech; he was frustrated because she could not understand what he was saying. Patient also have unsteady gait resulting in frequent falls (4 times) yesterday. Patient said he has been feeling "off" for the past 3 days but attributed it to the heat waves. During examination, patient still have slurred speech as per girlfriend, speech is getting better but not back to baseline. He has no HA, blurry vision, dizziness, numbness, tingling sensation, tongue bite, bowel/bladder incontinence." Will continue to follow for speech tx, as schedule permits. Auditory comprehension clinically judged to be WFL during evaluation. As per MD note: "47 y/o male with PMH of CVA (twice last one last year) with no sequela, DM-2, HTN came to the ED saying "I think I have a stroke". As per patient's significant other, patient woke up at 1:30PM with slurred speech; he was frustrated because she could not understand what he was saying. Patient also have unsteady gait resulting in frequent falls (4 times) yesterday. Patient said he has been feeling "off" for the past 3 days but attributed it to the heat waves. During examination, patient still have slurred speech as per girlfriend, speech is getting better but not back to baseline. He has no HA, blurry vision, dizziness, numbness, tingling sensation, tongue bite, bowel/bladder incontinence."    CT head on 7/23/2019 with no acute findings. Pt with pending ? MRI Auditory comprehension clinically judged to be WFL during evaluation, based on informal assessment. Pt presents with rapid rate of speech, impacting speech intelligibility. Pt benefits from cues to reduce rate of speech when stating wants/needs.

## 2019-07-25 LAB
GLUCOSE BLDC GLUCOMTR-MCNC: 172 MG/DL — HIGH (ref 70–99)
GLUCOSE BLDC GLUCOMTR-MCNC: 178 MG/DL — HIGH (ref 70–99)
GLUCOSE BLDC GLUCOMTR-MCNC: 186 MG/DL — HIGH (ref 70–99)
GLUCOSE BLDC GLUCOMTR-MCNC: 201 MG/DL — HIGH (ref 70–99)

## 2019-07-25 PROCEDURE — 70551 MRI BRAIN STEM W/O DYE: CPT | Mod: 26

## 2019-07-25 PROCEDURE — 70547 MR ANGIOGRAPHY NECK W/O DYE: CPT | Mod: 26

## 2019-07-25 PROCEDURE — 99232 SBSQ HOSP IP/OBS MODERATE 35: CPT

## 2019-07-25 PROCEDURE — 99233 SBSQ HOSP IP/OBS HIGH 50: CPT

## 2019-07-25 PROCEDURE — 70544 MR ANGIOGRAPHY HEAD W/O DYE: CPT | Mod: 26,59

## 2019-07-25 PROCEDURE — 70150 X-RAY EXAM OF FACIAL BONES: CPT | Mod: 26

## 2019-07-25 RX ORDER — BACITRACIN ZINC 500 UNIT/G
1 OINTMENT IN PACKET (EA) TOPICAL
Refills: 0 | Status: DISCONTINUED | OUTPATIENT
Start: 2019-07-25 | End: 2019-07-27

## 2019-07-25 RX ORDER — LIDOCAINE HYDROCHLORIDE AND EPINEPHRINE 10; 10 MG/ML; UG/ML
4 INJECTION, SOLUTION INFILTRATION; PERINEURAL ONCE
Refills: 0 | Status: COMPLETED | OUTPATIENT
Start: 2019-07-25 | End: 2019-07-25

## 2019-07-25 RX ORDER — ENOXAPARIN SODIUM 100 MG/ML
30 INJECTION SUBCUTANEOUS DAILY
Refills: 0 | Status: DISCONTINUED | OUTPATIENT
Start: 2019-07-25 | End: 2019-07-27

## 2019-07-25 RX ADMIN — GABAPENTIN 400 MILLIGRAM(S): 400 CAPSULE ORAL at 21:13

## 2019-07-25 RX ADMIN — SODIUM CHLORIDE 125 MILLILITER(S): 9 INJECTION INTRAMUSCULAR; INTRAVENOUS; SUBCUTANEOUS at 05:58

## 2019-07-25 RX ADMIN — ESCITALOPRAM OXALATE 20 MILLIGRAM(S): 10 TABLET, FILM COATED ORAL at 13:04

## 2019-07-25 RX ADMIN — OXYCODONE AND ACETAMINOPHEN 1 TABLET(S): 5; 325 TABLET ORAL at 21:32

## 2019-07-25 RX ADMIN — OXYCODONE AND ACETAMINOPHEN 1 TABLET(S): 5; 325 TABLET ORAL at 06:00

## 2019-07-25 RX ADMIN — Medication 2: at 08:36

## 2019-07-25 RX ADMIN — GABAPENTIN 400 MILLIGRAM(S): 400 CAPSULE ORAL at 13:08

## 2019-07-25 RX ADMIN — LIDOCAINE HYDROCHLORIDE AND EPINEPHRINE 4 MILLILITER(S): 10; 10 INJECTION, SOLUTION INFILTRATION; PERINEURAL at 09:55

## 2019-07-25 RX ADMIN — ATORVASTATIN CALCIUM 40 MILLIGRAM(S): 80 TABLET, FILM COATED ORAL at 21:13

## 2019-07-25 RX ADMIN — Medication 4: at 17:16

## 2019-07-25 RX ADMIN — Medication 1 APPLICATION(S): at 14:59

## 2019-07-25 RX ADMIN — Medication 325 MILLIGRAM(S): at 13:04

## 2019-07-25 RX ADMIN — AMLODIPINE BESYLATE 10 MILLIGRAM(S): 2.5 TABLET ORAL at 05:58

## 2019-07-25 RX ADMIN — Medication 50 MILLIGRAM(S): at 05:58

## 2019-07-25 RX ADMIN — LOSARTAN POTASSIUM 100 MILLIGRAM(S): 100 TABLET, FILM COATED ORAL at 05:58

## 2019-07-25 RX ADMIN — Medication 2: at 13:04

## 2019-07-25 RX ADMIN — Medication 25 MILLIGRAM(S): at 05:58

## 2019-07-25 RX ADMIN — Medication 50 MILLIGRAM(S): at 17:16

## 2019-07-25 RX ADMIN — ENOXAPARIN SODIUM 30 MILLIGRAM(S): 100 INJECTION SUBCUTANEOUS at 21:13

## 2019-07-25 RX ADMIN — GABAPENTIN 400 MILLIGRAM(S): 400 CAPSULE ORAL at 05:58

## 2019-07-25 RX ADMIN — OXYCODONE AND ACETAMINOPHEN 1 TABLET(S): 5; 325 TABLET ORAL at 13:30

## 2019-07-25 RX ADMIN — OXYCODONE AND ACETAMINOPHEN 1 TABLET(S): 5; 325 TABLET ORAL at 13:08

## 2019-07-25 RX ADMIN — OXYCODONE AND ACETAMINOPHEN 1 TABLET(S): 5; 325 TABLET ORAL at 20:32

## 2019-07-25 NOTE — OCCUPATIONAL THERAPY INITIAL EVALUATION ADULT - ADDITIONAL COMMENTS
Pt lives single level apartment with 5STE and no steps inside; bedroom and bathroom are on main level. Bathroom has bathtub with curtains. Pt does not own any DME. Pt is right handed. Pt drives. Pt's girlfriend is able and available to assist pt upon discharge.

## 2019-07-25 NOTE — OCCUPATIONAL THERAPY INITIAL EVALUATION ADULT - LEVEL OF INDEPENDENCE: SCOOT/BRIDGE, REHAB EVAL
CARDIOLOGY ASSESSMENT & PLAN:  Essential hypertension  Blood pressure is reasonably well controlled  Last echocardiogram in 2017 showed normal left ventricular function  - we are lowering the dose of lisinopril to 10 mg daily as long as this would maintain her blood pressure consistently less than 130/80   - we are continuing carvedilol and other medications  - Dietary and medical compliance are reinforced  - Advised  to report any concerning symptoms such as chest pain, shortness of breath, decline in exercise tolerance or presyncope/syncope  Mixed hyperlipidemia  Tolerating low-dose simvastatin therapy  No recent lipid profile available  Will need periodic monitoring of LFTs and lipid profile  DIAGNOSES:  1  Benign essential hypertension  2  Mixed dyslipidemia  3  History of chest pain  4  History of diabetes mellitus  5  Hypo and hyperthyroidism  6  Anemia  7  Degenerative joint disease  8  History of diverticulosis  9  Cervical radiculopathy  10 History of carotid artery disease  11  History of cholecystectomy and appendectomy  12  Vitamin-D deficiency  13  History of umbilical hernia    Dobutamine stress echocardiogram at [facility] in March 2017 was negative for ischemia  EF was reported as 60%  No significant valvular abnormality  CURRENT ECG:  Results for orders placed or performed in visit on 01/02/19   POCT ECG    Narrative    Sinus rhythm, HR 68 bpm, leftward axis, RSR prime in lead V1, nonspecific ST T-wave abnormalities  independent/scooting
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
## 2019-07-25 NOTE — DISCHARGE NOTE NURSING/CASE MANAGEMENT/SOCIAL WORK - NSDCDPATPORTLINK_GEN_ALL_CORE
You can access the [facility] Patient Portal, offered by [facility], by registering with the following website: [email or web address]
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
## 2019-07-25 NOTE — OCCUPATIONAL THERAPY INITIAL EVALUATION ADULT - GENERAL OBSERVATIONS, REHAB EVAL
Received pt in semi-fitzpatrick position in bed, +IV lock, +telemetry, girlfriend present; pt agrees to OT.

## 2019-07-25 NOTE — OCCUPATIONAL THERAPY INITIAL EVALUATION ADULT - MODIFIED CLINICAL TEST OF SENSORY INTEGRATION IN BALANCE TEST
dynamic sitting edge of bed with supervision; dynamic standing with RW with contact guard assistance
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
## 2019-07-25 NOTE — OCCUPATIONAL THERAPY INITIAL EVALUATION ADULT - PLANNED THERAPY INTERVENTIONS, OT EVAL
ADL retraining/balance training/neuromuscular re-education/bed mobility training/transfer training/toilet
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
## 2019-07-25 NOTE — DISCHARGE NOTE NURSING/CASE MANAGEMENT/SOCIAL WORK - NSDCPEPTSTRK_GEN_ALL_CORE
Prescribed medications/Risk factors for stroke/Stroke education booklet/Stroke warning signs and symptoms/Signs and symptoms of stroke/Need for follow up after discharge/Call 911 for stroke/Stroke support groups for patients, families, and friends

## 2019-07-25 NOTE — CONSULT NOTE ADULT - SUBJECTIVE AND OBJECTIVE BOX
[facility], [facility]                                   [address] [place] [address]                                                      PHONE: [phone]                                                         FAX: [phone]  [email or web address]  ---------------------------------------------------------------------------------------------------------------------------------    Reason for Consult: CVA     HPI:  BETSY COX is an 48 year old male with history of CVA (twice last one last year) with no sequela, DM-2, HTN with LVH came to the ED saying "I think I have a stroke". As per patient's significant other, patient woke up at 1:30PM with slurred speech; he was frustrated because she could not understand what he was saying. Patient also have unsteady gait resulting in frequent falls (4 times) yesterday. Patient said he has been feeling "off" for the past 3 days but attributed it to the heat waves. During examination, patient still have slurred speech as per girlfriend, speech is getting better but not back to baseline. He has no HA, blurry vision, dizziness, numbness, tingling sensation, tongue bite, bowel/bladder incontinence.     PAST MEDICAL & SURGICAL HISTORY:  CVA (cerebral vascular accident): HTN  DM-2  No significant past surgical history      No Known Allergies      MEDICATIONS  (STANDING):  amLODIPine   Tablet 10 milliGRAM(s) Oral daily  aspirin enteric coated 325 milliGRAM(s) Oral daily  atorvastatin 40 milliGRAM(s) Oral at bedtime  BACItracin   Ointment 1 Application(s) Topical two times a day  dextrose 5%. 1000 milliLiter(s) (50 mL/Hr) IV Continuous <Continuous>  dextrose 50% Injectable 12.5 Gram(s) IV Push once  dextrose 50% Injectable 25 Gram(s) IV Push once  dextrose 50% Injectable 25 Gram(s) IV Push once  enoxaparin Injectable 30 milliGRAM(s) SubCutaneous daily  escitalopram 20 milliGRAM(s) Oral daily  gabapentin 400 milliGRAM(s) Oral three times a day  hydrochlorothiazide 25 milliGRAM(s) Oral daily  insulin lispro (HumaLOG) corrective regimen sliding scale   SubCutaneous three times a day before meals  losartan 100 milliGRAM(s) Oral daily  metoprolol tartrate 50 milliGRAM(s) Oral two times a day  nicotine - 21 mG/24Hr(s) Patch 1 patch Transdermal daily    MEDICATIONS  (PRN):  dextrose 40% Gel 15 Gram(s) Oral once PRN Blood Glucose LESS THAN 70 milliGRAM(s)/deciliter  glucagon  Injectable 1 milliGRAM(s) IntraMuscular once PRN Glucose LESS THAN 70 milligrams/deciliter  oxyCODONE    5 mG/acetaminophen 325 mG 1 Tablet(s) Oral every 6 hours PRN Severe Pain (7 - 10)      Social History:  Cigarettes:         ex smoker           Alchohol:        occ         Illicit Drug Abuse:  none     FH negative     ROS: Negative other than as mentioned in HPI.    Vital Signs Last 24 Hrs  T(C): 36.3 (25 Jul 2019 08:17), Max: 37 (24 Jul 2019 21:40)  T(F): 97.3 (25 Jul 2019 08:17), Max: 98.6 (24 Jul 2019 21:40)  HR: 63 (25 Jul 2019 12:55) (62 - 67)  BP: 139/95 (25 Jul 2019 12:55) (134/76 - 161/93)  BP(mean): 109 (25 Jul 2019 12:55) (100 - 114)  RR: 17 (25 Jul 2019 12:55) (12 - 21)  SpO2: 100% (25 Jul 2019 12:55) (98% - 100%)  ICU Vital Signs Last 24 Hrs  BETSY COX  I&O's Detail    24 Jul 2019 07:01  -  25 Jul 2019 07:00  --------------------------------------------------------  IN:    Oral Fluid: 480 mL    sodium chloride 0.9%: 1250 mL  Total IN: 1730 mL    OUT:    Voided: 600 mL  Total OUT: 600 mL    Total NET: 1130 mL      25 Jul 2019 07:01  -  25 Jul 2019 15:04  --------------------------------------------------------  IN:    sodium chloride 0.9%: 125 mL  Total IN: 125 mL    OUT:  Total OUT: 0 mL    Total NET: 125 mL        I&O's Summary    24 Jul 2019 07:01  -  25 Jul 2019 07:00  --------------------------------------------------------  IN: 1730 mL / OUT: 600 mL / NET: 1130 mL    25 Jul 2019 07:01  -  25 Jul 2019 15:04  --------------------------------------------------------  IN: 125 mL / OUT: 0 mL / NET: 125 mL      Drug Dosing Radha  BETSY COX      PHYSICAL EXAM:  General: Appears well developed, well nourished alert and cooperative.  HEENT: Head; normocephalic, atraumatic.  Eyes: Pupils reactive, cornea wnl.  Neck: Supple, no nodes adenopathy, no NVD or carotid bruit or thyromegaly.  CARDIOVASCULAR: Normal S1 and S2, 2/6 murmur, rub, gallop or lift.   LUNGS: No rales, rhonchi or wheeze. Normal breath sounds bilaterally.  ABDOMEN: Soft, nontender without mass or organomegaly. bowel sounds normoactive.  EXTREMITIES: No clubbing, cyanosis or edema. Distal pulses wnl.   SKIN: warm and dry with normal turgor.  NEURO: Alert/oriented x 3/normal motor exam. No pathologic reflexes.    PSYCH: normal affect.        LABS:                        13.1   10.13 )-----------( 292      ( 23 Jul 2019 16:22 )             39.7     07-23    139  |  103  |  9.0  ----------------------------<  174<H>  3.5   |  23.0  |  1.16    Ca    9.7      23 Jul 2019 16:22    TPro  7.7  /  Alb  4.1  /  TBili  0.3<L>  /  DBili  x   /  AST  13  /  ALT  10  /  AlkPhos  82  07-23    BETSY COX  CARDIAC MARKERS ( 23 Jul 2019 23:31 )  x     / <0.01 ng/mL / x     / x     / x      CARDIAC MARKERS ( 23 Jul 2019 16:22 )  x     / <0.01 ng/mL / 88 U/L / x     / x          PT/INR - ( 23 Jul 2019 16:22 )   PT: 12.0 sec;   INR: 1.04 ratio         PTT - ( 23 Jul 2019 16:22 )  PTT:36.7 sec      RADIOLOGY & ADDITIONAL STUDIES:    INTERPRETATION OF TELEMETRY (personally reviewed):    ECG:   Diagnosis Line Normal sinus rhythm  Left axis deviation  Pulmonary disease pattern  Voltage criteria for left ventricular hypertrophy  Cannot rule out Septal infarct , age undetermined  ST & T wave abnormality, consider lateral ischemia  Abnormal ECG      ECHO:  Summary:   1. Technically good study.   2. Normal global left ventricular systolic function.   3. Left ventricular ejection fraction, by visual estimation, is 55 to   60%.   4. Spectral Doppler shows pseudonormal pattern of left ventricular   myocardial filling (Grade II diastolic dysfunction).   5. There is severe concentric left ventricular hypertrophy.   6. Elevated mean left atrial pressure, estimated at 20 mmHg.   7. Moderately enlarged left atrium.   8. Mild mitral valve regurgitation.   9. There is no evidence of pericardial effusion.        STRESS TEST: none     CARDIAC CATHETERIZATION: none       IMPRESSION:       1.  Right carotid system:  No hemodynamically significant stenosis is   found.    2.  Left carotid system:  No hemodynamically significant stenosis is   found.        IMPRESSION:  Small focal diffusion abnormality in the right dorsal shawn may represent   acute ischemic injury/infarct.     Scattered nonspecific white matter abnormality in the periventricular,   subcortical and deep white matter distribution as well as brainstem, in   particular shawn with more confluent white matter abnormality in the right   corona radiata, partial differential diagnoses include gliosis related to   vasculopathy/arteriopathy, inflammatory or demyelinating process as   discussed above.        Assessment and Plan:  In summary, BETSY COX is an 48y Male with past medical history significant for 48 year old male with history of CVA (twice last one last year) with no sequela, DM-2, HTN with LVH came to the ED saying "I think I have a stroke". As per patient's significant other, patient woke up at 1:30PM with slurred speech; he was frustrated because she could not understand what he was saying. Patient also have unsteady gait resulting in frequent falls (4 times) yesterday. Patient said he has been feeling "off" for the past 3 days but attributed it to the heat waves. During examination, patient still have slurred speech as per girlfriend, speech is getting better but not back to baseline.  MRI showed small focal diffusion abnormality if the right dorsal shawn may represents acute ischemic injury/infarct    NPO after Midnight for TRINIDAD with possible ILR     D/W with stepdown team
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
## 2019-07-26 LAB
GLUCOSE BLDC GLUCOMTR-MCNC: 163 MG/DL — HIGH (ref 70–99)
GLUCOSE BLDC GLUCOMTR-MCNC: 183 MG/DL — HIGH (ref 70–99)
GLUCOSE BLDC GLUCOMTR-MCNC: 186 MG/DL — HIGH (ref 70–99)
LEGIONELLA AG UR QL: NEGATIVE — SIGNIFICANT CHANGE UP

## 2019-07-26 PROCEDURE — 99232 SBSQ HOSP IP/OBS MODERATE 35: CPT

## 2019-07-26 PROCEDURE — 99233 SBSQ HOSP IP/OBS HIGH 50: CPT

## 2019-07-26 RX ORDER — CLOPIDOGREL BISULFATE 75 MG/1
75 TABLET, FILM COATED ORAL DAILY
Refills: 0 | Status: DISCONTINUED | OUTPATIENT
Start: 2019-07-26 | End: 2019-07-27

## 2019-07-26 RX ADMIN — Medication 50 MILLIGRAM(S): at 05:06

## 2019-07-26 RX ADMIN — OXYCODONE AND ACETAMINOPHEN 1 TABLET(S): 5; 325 TABLET ORAL at 06:20

## 2019-07-26 RX ADMIN — Medication 1 APPLICATION(S): at 05:09

## 2019-07-26 RX ADMIN — Medication 50 MILLIGRAM(S): at 17:34

## 2019-07-26 RX ADMIN — AMLODIPINE BESYLATE 10 MILLIGRAM(S): 2.5 TABLET ORAL at 05:06

## 2019-07-26 RX ADMIN — ESCITALOPRAM OXALATE 20 MILLIGRAM(S): 10 TABLET, FILM COATED ORAL at 11:59

## 2019-07-26 RX ADMIN — OXYCODONE AND ACETAMINOPHEN 1 TABLET(S): 5; 325 TABLET ORAL at 17:34

## 2019-07-26 RX ADMIN — GABAPENTIN 400 MILLIGRAM(S): 400 CAPSULE ORAL at 05:06

## 2019-07-26 RX ADMIN — OXYCODONE AND ACETAMINOPHEN 1 TABLET(S): 5; 325 TABLET ORAL at 23:55

## 2019-07-26 RX ADMIN — OXYCODONE AND ACETAMINOPHEN 1 TABLET(S): 5; 325 TABLET ORAL at 18:58

## 2019-07-26 RX ADMIN — GABAPENTIN 400 MILLIGRAM(S): 400 CAPSULE ORAL at 21:59

## 2019-07-26 RX ADMIN — ENOXAPARIN SODIUM 30 MILLIGRAM(S): 100 INJECTION SUBCUTANEOUS at 21:59

## 2019-07-26 RX ADMIN — Medication 25 MILLIGRAM(S): at 05:06

## 2019-07-26 RX ADMIN — Medication 2: at 16:45

## 2019-07-26 RX ADMIN — ATORVASTATIN CALCIUM 40 MILLIGRAM(S): 80 TABLET, FILM COATED ORAL at 21:59

## 2019-07-26 RX ADMIN — Medication 325 MILLIGRAM(S): at 11:59

## 2019-07-26 RX ADMIN — LOSARTAN POTASSIUM 100 MILLIGRAM(S): 100 TABLET, FILM COATED ORAL at 05:06

## 2019-07-26 RX ADMIN — Medication 2: at 08:59

## 2019-07-26 RX ADMIN — Medication 1 APPLICATION(S): at 17:35
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
## 2019-07-26 NOTE — PROGRESS NOTE ADULT - ASSESSMENT
48y Male with symptoms suggestive of stroke. He has multiple risk factors.     Stroke  Confirmed on MRI.   Continue antiplatelet and statin.   Continue OT/PT and speech therapy.   Cardiology evaluation appreciated.   Agree with plan for TRINIDAD and loop recorder if TRINIDAD negative.    Hypertension   Control blood pressure.    Diabetes Mellitus   Control blood sugar.     Case discussed with Dr Mcfarlane.

## 2019-07-27 ENCOUNTER — TRANSCRIPTION ENCOUNTER (OUTPATIENT)
Age: 49
End: 2019-07-27

## 2019-07-27 VITALS
OXYGEN SATURATION: 95 % | SYSTOLIC BLOOD PRESSURE: 122 MMHG | TEMPERATURE: 98 F | DIASTOLIC BLOOD PRESSURE: 70 MMHG | HEART RATE: 67 BPM | RESPIRATION RATE: 14 BRPM

## 2019-07-27 DIAGNOSIS — F17.200 NICOTINE DEPENDENCE, UNSPECIFIED, UNCOMPLICATED: ICD-10-CM

## 2019-07-27 LAB
ANION GAP SERPL CALC-SCNC: 13 MMOL/L — SIGNIFICANT CHANGE UP (ref 5–17)
BUN SERPL-MCNC: 15 MG/DL — SIGNIFICANT CHANGE UP (ref 8–20)
CALCIUM SERPL-MCNC: 9.3 MG/DL — SIGNIFICANT CHANGE UP (ref 8.6–10.2)
CHLORIDE SERPL-SCNC: 100 MMOL/L — SIGNIFICANT CHANGE UP (ref 98–107)
CO2 SERPL-SCNC: 24 MMOL/L — SIGNIFICANT CHANGE UP (ref 22–29)
CREAT SERPL-MCNC: 1.1 MG/DL — SIGNIFICANT CHANGE UP (ref 0.5–1.3)
GLUCOSE BLDC GLUCOMTR-MCNC: 201 MG/DL — HIGH (ref 70–99)
GLUCOSE BLDC GLUCOMTR-MCNC: 228 MG/DL — HIGH (ref 70–99)
GLUCOSE SERPL-MCNC: 261 MG/DL — HIGH (ref 70–115)
HCT VFR BLD CALC: 35.7 % — LOW (ref 39–50)
HGB BLD-MCNC: 11.5 G/DL — LOW (ref 13–17)
MCHC RBC-ENTMCNC: 29 PG — SIGNIFICANT CHANGE UP (ref 27–34)
MCHC RBC-ENTMCNC: 32.2 GM/DL — SIGNIFICANT CHANGE UP (ref 32–36)
MCV RBC AUTO: 90.2 FL — SIGNIFICANT CHANGE UP (ref 80–100)
PLATELET # BLD AUTO: 243 K/UL — SIGNIFICANT CHANGE UP (ref 150–400)
POTASSIUM SERPL-MCNC: 3.5 MMOL/L — SIGNIFICANT CHANGE UP (ref 3.5–5.3)
POTASSIUM SERPL-SCNC: 3.5 MMOL/L — SIGNIFICANT CHANGE UP (ref 3.5–5.3)
RBC # BLD: 3.96 M/UL — LOW (ref 4.2–5.8)
RBC # FLD: 12.9 % — SIGNIFICANT CHANGE UP (ref 10.3–14.5)
SODIUM SERPL-SCNC: 137 MMOL/L — SIGNIFICANT CHANGE UP (ref 135–145)
WBC # BLD: 11.41 K/UL — HIGH (ref 3.8–10.5)
WBC # FLD AUTO: 11.41 K/UL — HIGH (ref 3.8–10.5)

## 2019-07-27 PROCEDURE — 99233 SBSQ HOSP IP/OBS HIGH 50: CPT

## 2019-07-27 PROCEDURE — 99239 HOSP IP/OBS DSCHRG MGMT >30: CPT

## 2019-07-27 RX ORDER — CLOPIDOGREL BISULFATE 75 MG/1
1 TABLET, FILM COATED ORAL
Qty: 30 | Refills: 0
Start: 2019-07-27 | End: 2019-08-25

## 2019-07-27 RX ORDER — INSULIN GLARGINE 100 [IU]/ML
30 INJECTION, SOLUTION SUBCUTANEOUS
Qty: 30 | Refills: 0
Start: 2019-07-27 | End: 2019-08-25

## 2019-07-27 RX ORDER — METFORMIN HYDROCHLORIDE 850 MG/1
1 TABLET ORAL
Qty: 60 | Refills: 0
Start: 2019-07-27 | End: 2019-08-25

## 2019-07-27 RX ORDER — METOPROLOL TARTRATE 50 MG
1 TABLET ORAL
Qty: 60 | Refills: 0
Start: 2019-07-27 | End: 2019-08-25

## 2019-07-27 RX ORDER — FAMOTIDINE 10 MG/ML
1 INJECTION INTRAVENOUS
Qty: 60 | Refills: 0
Start: 2019-07-27 | End: 2019-08-25

## 2019-07-27 RX ORDER — GABAPENTIN 400 MG/1
1 CAPSULE ORAL
Qty: 90 | Refills: 0
Start: 2019-07-27 | End: 2019-08-25

## 2019-07-27 RX ORDER — POTASSIUM CHLORIDE 20 MEQ
20 PACKET (EA) ORAL ONCE
Refills: 0 | Status: COMPLETED | OUTPATIENT
Start: 2019-07-27 | End: 2019-07-27

## 2019-07-27 RX ORDER — NICOTINE POLACRILEX 2 MG
1 GUM BUCCAL
Qty: 14 | Refills: 0
Start: 2019-07-27 | End: 2019-08-09

## 2019-07-27 RX ORDER — ESCITALOPRAM OXALATE 10 MG/1
1 TABLET, FILM COATED ORAL
Qty: 30 | Refills: 0
Start: 2019-07-27 | End: 2019-08-25

## 2019-07-27 RX ORDER — LOSARTAN POTASSIUM 100 MG/1
1 TABLET, FILM COATED ORAL
Qty: 30 | Refills: 0
Start: 2019-07-27 | End: 2019-08-25

## 2019-07-27 RX ORDER — AMLODIPINE BESYLATE 2.5 MG/1
1 TABLET ORAL
Qty: 30 | Refills: 0
Start: 2019-07-27 | End: 2019-08-25

## 2019-07-27 RX ORDER — METFORMIN HYDROCHLORIDE 850 MG/1
1 TABLET ORAL
Qty: 0 | Refills: 0 | DISCHARGE

## 2019-07-27 RX ORDER — ATORVASTATIN CALCIUM 80 MG/1
1 TABLET, FILM COATED ORAL
Qty: 30 | Refills: 0
Start: 2019-07-27 | End: 2019-08-25

## 2019-07-27 RX ADMIN — Medication 25 MILLIGRAM(S): at 05:33

## 2019-07-27 RX ADMIN — AMLODIPINE BESYLATE 10 MILLIGRAM(S): 2.5 TABLET ORAL at 05:33

## 2019-07-27 RX ADMIN — CLOPIDOGREL BISULFATE 75 MILLIGRAM(S): 75 TABLET, FILM COATED ORAL at 12:04

## 2019-07-27 RX ADMIN — Medication 4: at 12:05

## 2019-07-27 RX ADMIN — GABAPENTIN 400 MILLIGRAM(S): 400 CAPSULE ORAL at 05:33

## 2019-07-27 RX ADMIN — OXYCODONE AND ACETAMINOPHEN 1 TABLET(S): 5; 325 TABLET ORAL at 06:56

## 2019-07-27 RX ADMIN — OXYCODONE AND ACETAMINOPHEN 1 TABLET(S): 5; 325 TABLET ORAL at 07:47

## 2019-07-27 RX ADMIN — Medication 20 MILLIEQUIVALENT(S): at 12:04

## 2019-07-27 RX ADMIN — OXYCODONE AND ACETAMINOPHEN 1 TABLET(S): 5; 325 TABLET ORAL at 00:56

## 2019-07-27 RX ADMIN — LOSARTAN POTASSIUM 100 MILLIGRAM(S): 100 TABLET, FILM COATED ORAL at 05:33

## 2019-07-27 RX ADMIN — Medication 50 MILLIGRAM(S): at 05:33

## 2019-07-27 RX ADMIN — Medication 1 APPLICATION(S): at 05:34

## 2019-07-27 RX ADMIN — ESCITALOPRAM OXALATE 20 MILLIGRAM(S): 10 TABLET, FILM COATED ORAL at 12:04

## 2019-07-27 RX ADMIN — Medication 4: at 08:49
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
## 2019-07-27 NOTE — DISCHARGE NOTE PROVIDER - PROVIDER TOKENS
PROVIDER:[TOKEN:[[id number]]],FREE:[LAST:[coldoff],PHONE:[(   )    -],FAX:[(   )    -],ADDRESS:[pcp]],PROVIDER:[TOKEN:[[id number]]]

## 2019-07-27 NOTE — DISCHARGE NOTE PROVIDER - NSDCCPCAREPLAN_GEN_ALL_CORE_FT
PRINCIPAL DISCHARGE DIAGNOSIS  Diagnosis: Cerebrovascular accident (CVA)  Assessment and Plan of Treatment:       SECONDARY DISCHARGE DIAGNOSES  Diagnosis: Diabetes  Assessment and Plan of Treatment:

## 2019-07-27 NOTE — DISCHARGE NOTE PROVIDER - CARE PROVIDER_API CALL
Tarun Sims)  Cardiovascular Disease; Internal Medicine  [address] [place] [address]  Phone: [phone]  Fax: [phone]  Follow Up Time:     coldoff,   pcp  Phone: (   )    -  Fax: (   )    -  Follow Up Time:     Timothy Parker)  Neurology; Vascular Neurology  [address] [facility], [address]  [place] [address]  Phone: [phone]  Fax: [phone]  Follow Up Time:

## 2019-07-27 NOTE — PROGRESS NOTE ADULT - SUBJECTIVE AND OBJECTIVE BOX
[facility]                                        Neurology at [place]                                 Aubrey Yanez, & Evelio                                  [address]                                        [place], [place], [address]                                             [phone]        CC: Stroke    HPI:   The patient is a 48y Male who reports that he went to sleep on 7/23/19 at around 2 am and awoke around 1:30 pm with slurred speech. This has been with him ever since. It is moderately severe. It has persisted since onset. In addition he reports that his gait is somewhat unsteady. There was no associated swallowing difficulty.     Interim history:  Now on 4 [place].   Still with slurred speech although improved from admission.     ROS:   Denies headache or dizziness.  Denies chest pain.  Denies shortness of breath.    MEDICATIONS  (STANDING):  amLODIPine   Tablet 10 milliGRAM(s) Oral daily  atorvastatin 40 milliGRAM(s) Oral at bedtime  BACItracin   Ointment 1 Application(s) Topical two times a day  clopidogrel Tablet 75 milliGRAM(s) Oral daily  dextrose 5%. 1000 milliLiter(s) (50 mL/Hr) IV Continuous <Continuous>  enoxaparin Injectable 30 milliGRAM(s) SubCutaneous daily  escitalopram 20 milliGRAM(s) Oral daily  gabapentin 400 milliGRAM(s) Oral three times a day  hydrochlorothiazide 25 milliGRAM(s) Oral daily  insulin lispro (HumaLOG) corrective regimen sliding scale   SubCutaneous three times a day before meals  losartan 100 milliGRAM(s) Oral daily  metoprolol tartrate 50 milliGRAM(s) Oral two times a day  nicotine - 21 mG/24Hr(s) Patch 1 patch Transdermal daily      Vital Signs Last 24 Hrs  T(C): 37.1 (27 Jul 2019 05:02), Max: 37.1 (27 Jul 2019 05:02)  T(F): 98.8 (27 Jul 2019 05:02), Max: 98.8 (27 Jul 2019 05:02)  HR: 73 (27 Jul 2019 05:02) (56 - 73)  BP: 151/79 (27 Jul 2019 05:02) (117/78 - 152/80)  RR: 16 (27 Jul 2019 05:02) (16 - 18)  SpO2: 96% (27 Jul 2019 05:02) (96% - 100%)    Detailed Neurologic Exam:    Mental status: The patient is awake and alert. There is no aphasia. There is mild dysarthria.     Cranial nerves: Pupils equal and react symmetrically to light. There is no visual field deficit to threat. Extraocular motion is full with no nystagmus. There is no ptosis. Facial sensation is intact. Facial musculature is symmetric. Palate elevates symmetrically. Tongue is midline.    Motor: There is normal bulk and tone.  There is no tremor.  Strength grossly 5/5 bilaterally.    Sensation: Grossly intact to light touch and pin.    Reflexes: 2+ throughout and plantar responses are flexor.    Cerebellar: No dysmetria on finger nose testing.    Labs:     07-27    137  |  100  |  15.0  ----------------------------<  261<H>  3.5   |  24.0  |  1.10    Ca    9.3      27 Jul 2019 06:31                              11.5   11.41 )-----------( 243      ( 27 Jul 2019 06:31 )             35.7       Rad:   TRINIDAD done 7/26/19. Negative for thrombus.
BETSY COX    [id number]    48y      Male    Patient is a 48y old  Male who presents with a chief complaint of CVA (26 Jul 2019 10:16)      INTERVAL HPI/OVERNIGHT EVENTS:    Patient is feeling some improvement of speech, denies having weakness in any part of the body, has no fever, chills, chest pain, nausea, vomiting.     REVIEW OF SYSTEMS:    CONSTITUTIONAL: No fever, weight loss, or fatigue  RESPIRATORY: No cough, No shortness of breath  CARDIOVASCULAR: No chest pain, palpitations  GASTROINTESTINAL: No abdominal, No nausea, vomiting  NEUROLOGICAL: No headaches,  loss of strength, improving speech   MISCELLANEOUS: No joint swelling or pain       Vital Signs Last 24 Hrs  T(C): 36.7 (26 Jul 2019 13:07), Max: 36.9 (25 Jul 2019 18:56)  T(F): 98 (26 Jul 2019 13:07), Max: 98.4 (25 Jul 2019 18:56)  HR: 56 (26 Jul 2019 13:07) (56 - 71)  BP: 117/78 (26 Jul 2019 13:07) (117/78 - 146/78)  BP(mean): 99 (25 Jul 2019 17:13) (99 - 99)  RR: 16 (26 Jul 2019 13:07) (16 - 18)  SpO2: 98% (26 Jul 2019 13:07) (97% - 100%)    PHYSICAL EXAM:    GENERAL: Middle age male looking comfortable    HEENT: PERRL, +EOMI  NECK: soft, Supple, No JVD,   CHEST/LUNG: Clear to auscultate bilaterally; No wheezing  HEART: S1S2+, Regular rate and rhythm; No murmurs  ABDOMEN: Soft, Nontender, Nondistended; Bowel sounds present  EXTREMITIES:  1+ Peripheral Pulses, No edema  SKIN: No rashes or lesions  NEURO: AAOX3, no focal deficits, no motor r sensory loss  PSYCH: normal mood        25 Jul 2019 07:01  -  26 Jul 2019 07:00  --------------------------------------------------------  IN: 475 mL / OUT: 750 mL / NET: -275 mL        MEDICATIONS  (STANDING):  amLODIPine   Tablet 10 milliGRAM(s) Oral daily  aspirin enteric coated 325 milliGRAM(s) Oral daily  atorvastatin 40 milliGRAM(s) Oral at bedtime  BACItracin   Ointment 1 Application(s) Topical two times a day  dextrose 5%. 1000 milliLiter(s) (50 mL/Hr) IV Continuous <Continuous>  dextrose 50% Injectable 12.5 Gram(s) IV Push once  dextrose 50% Injectable 25 Gram(s) IV Push once  dextrose 50% Injectable 25 Gram(s) IV Push once  enoxaparin Injectable 30 milliGRAM(s) SubCutaneous daily  escitalopram 20 milliGRAM(s) Oral daily  gabapentin 400 milliGRAM(s) Oral three times a day  hydrochlorothiazide 25 milliGRAM(s) Oral daily  insulin lispro (HumaLOG) corrective regimen sliding scale   SubCutaneous three times a day before meals  losartan 100 milliGRAM(s) Oral daily  metoprolol tartrate 50 milliGRAM(s) Oral two times a day  nicotine - 21 mG/24Hr(s) Patch 1 patch Transdermal daily    MEDICATIONS  (PRN):  dextrose 40% Gel 15 Gram(s) Oral once PRN Blood Glucose LESS THAN 70 milliGRAM(s)/deciliter  glucagon  Injectable 1 milliGRAM(s) IntraMuscular once PRN Glucose LESS THAN 70 milligrams/deciliter  oxyCODONE    5 mG/acetaminophen 325 mG 1 Tablet(s) Oral every 6 hours PRN Severe Pain (7 - 10)
[facility]                                   [address] [place] [address]                                                      PHONE: [phone]                                                         FAX: [phone]  [email or web address]  ---------------------------------------------------------------------------------------------------------------------------------    Reason for Consult: CVA     HPI:  BETSY COX is an 48 year old male with history of CVA (twice last one last year) with no sequela, DM-2, HTN with LVH came to the ED saying "I think I have a stroke". As per patient's significant other, patient woke up at 1:30PM with slurred speech; he was frustrated because she could not understand what he was saying. Patient also have unsteady gait resulting in frequent falls (4 times) yesterday. Patient said he has been feeling "off" for the past 3 days but attributed it to the heat waves. During examination, patient still have slurred speech as per girlfriend, speech is getting better but not back to baseline. He has no HA, blurry vision, dizziness, numbness, tingling sensation, tongue bite, bowel/bladder incontinence.     Recent Events: Had TRINIDAD done yesterday, there is no evidence of intracardiac source of emboli. ILR placed yesterday with no complications.  Feels better overnight, back to baseline. Had refused medications this morning, willing to be discharged.  Appears stable cardiac-wise.    PAST MEDICAL & SURGICAL HISTORY:  CVA (cerebral vascular accident): HTN  DM-2  No significant past surgical history      No Known Allergies      MEDICATIONS  (STANDING):  amLODIPine   Tablet 10 milliGRAM(s) Oral daily  aspirin enteric coated 325 milliGRAM(s) Oral daily  atorvastatin 40 milliGRAM(s) Oral at bedtime  BACItracin   Ointment 1 Application(s) Topical two times a day  dextrose 5%. 1000 milliLiter(s) (50 mL/Hr) IV Continuous <Continuous>  dextrose 50% Injectable 12.5 Gram(s) IV Push once  dextrose 50% Injectable 25 Gram(s) IV Push once  dextrose 50% Injectable 25 Gram(s) IV Push once  enoxaparin Injectable 30 milliGRAM(s) SubCutaneous daily  escitalopram 20 milliGRAM(s) Oral daily  gabapentin 400 milliGRAM(s) Oral three times a day  hydrochlorothiazide 25 milliGRAM(s) Oral daily  insulin lispro (HumaLOG) corrective regimen sliding scale   SubCutaneous three times a day before meals  losartan 100 milliGRAM(s) Oral daily  metoprolol tartrate 50 milliGRAM(s) Oral two times a day  nicotine - 21 mG/24Hr(s) Patch 1 patch Transdermal daily    MEDICATIONS  (PRN):  dextrose 40% Gel 15 Gram(s) Oral once PRN Blood Glucose LESS THAN 70 milliGRAM(s)/deciliter  glucagon  Injectable 1 milliGRAM(s) IntraMuscular once PRN Glucose LESS THAN 70 milligrams/deciliter  oxyCODONE    5 mG/acetaminophen 325 mG 1 Tablet(s) Oral every 6 hours PRN Severe Pain (7 - 10)      Social History:  Cigarettes:         ex smoker           Alchohol:        occ         Illicit Drug Abuse:  none     FH negative     ROS: Negative other than as mentioned in HPI.    Vital Signs Last 24 Hrs  T(C): 36.3 (25 Jul 2019 08:17), Max: 37 (24 Jul 2019 21:40)  T(F): 97.3 (25 Jul 2019 08:17), Max: 98.6 (24 Jul 2019 21:40)  HR: 63 (25 Jul 2019 12:55) (62 - 67)  BP: 139/95 (25 Jul 2019 12:55) (134/76 - 161/93)  BP(mean): 109 (25 Jul 2019 12:55) (100 - 114)  RR: 17 (25 Jul 2019 12:55) (12 - 21)  SpO2: 100% (25 Jul 2019 12:55) (98% - 100%)  ICU Vital Signs Last 24 Hrs  BETSY COX  I&O's Detail    24 Jul 2019 07:01  -  25 Jul 2019 07:00  --------------------------------------------------------  IN:    Oral Fluid: 480 mL    sodium chloride 0.9%: 1250 mL  Total IN: 1730 mL    OUT:    Voided: 600 mL  Total OUT: 600 mL    Total NET: 1130 mL      25 Jul 2019 07:01  -  25 Jul 2019 15:04  --------------------------------------------------------  IN:    sodium chloride 0.9%: 125 mL  Total IN: 125 mL    OUT:  Total OUT: 0 mL    Total NET: 125 mL        I&O's Summary    24 Jul 2019 07:01 - 25 Jul 2019 07:00  --------------------------------------------------------  IN: 1730 mL / OUT: 600 mL / NET: 1130 mL    25 Jul 2019 07:01  -  25 Jul 2019 15:04  --------------------------------------------------------  IN: 125 mL / OUT: 0 mL / NET: 125 mL      Drug Dosing Weight  BETSY COX      PHYSICAL EXAM:  General: Appears well developed, well nourished alert and cooperative.  HEENT: Head; normocephalic, atraumatic.  Eyes: Pupils reactive, cornea wnl.  Neck: Supple, no nodes adenopathy, no NVD or carotid bruit or thyromegaly.  CARDIOVASCULAR: Normal S1 and S2, 2/6 murmur, rub, gallop or lift.   LUNGS: No rales, rhonchi or wheeze. Normal breath sounds bilaterally.  ABDOMEN: Soft, nontender without mass or organomegaly. bowel sounds normoactive.  EXTREMITIES: No clubbing, cyanosis or edema. Distal pulses wnl.   SKIN: warm and dry with normal turgor.  NEURO: Alert/oriented x 3/normal motor exam. No pathologic reflexes.    PSYCH: normal affect.        LABS:                        13.1   10.13 )-----------( 292      ( 23 Jul 2019 16:22 )             39.7     07-23    139  |  103  |  9.0  ----------------------------<  174<H>  3.5   |  23.0  |  1.16    Ca    9.7      23 Jul 2019 16:22    TPro  7.7  /  Alb  4.1  /  TBili  0.3<L>  /  DBili  x   /  AST  13  /  ALT  10  /  AlkPhos  82  07-23    BETSY COX  CARDIAC MARKERS ( 23 Jul 2019 23:31 )  x     / <0.01 ng/mL / x     / x     / x      CARDIAC MARKERS ( 23 Jul 2019 16:22 )  x     / <0.01 ng/mL / 88 U/L / x     / x          PT/INR - ( 23 Jul 2019 16:22 )   PT: 12.0 sec;   INR: 1.04 ratio         PTT - ( 23 Jul 2019 16:22 )  PTT:36.7 sec      RADIOLOGY & ADDITIONAL STUDIES:    INTERPRETATION OF TELEMETRY (personally reviewed):    ECG:   Diagnosis Line Normal sinus rhythm  Left axis deviation  Pulmonary disease pattern  Voltage criteria for left ventricular hypertrophy  Cannot rule out Septal infarct , age undetermined  ST & T wave abnormality, consider lateral ischemia  Abnormal ECG      ECHO:  Summary:   1. Technically good study.   2. Normal global left ventricular systolic function.   3. Left ventricular ejection fraction, by visual estimation, is 55 to   60%.   4. Spectral Doppler shows pseudonormal pattern of left ventricular   myocardial filling (Grade II diastolic dysfunction).   5. There is severe concentric left ventricular hypertrophy.   6. Elevated mean left atrial pressure, estimated at 20 mmHg.   7. Moderately enlarged left atrium.   8. Mild mitral valve regurgitation.   9. There is no evidence of pericardial effusion.        STRESS TEST: none     CARDIAC CATHETERIZATION: none       IMPRESSION:       1.  Right carotid system:  No hemodynamically significant stenosis is   found.    2.  Left carotid system:  No hemodynamically significant stenosis is   found.        IMPRESSION:  Small focal diffusion abnormality in the right dorsal shawn may represent   acute ischemic injury/infarct.     Scattered nonspecific white matter abnormality in the periventricular,   subcortical and deep white matter distribution as well as brainstem, in   particular shawn with more confluent white matter abnormality in the right   corona radiata, partial differential diagnoses include gliosis related to   vasculopathy/arteriopathy, inflammatory or demyelinating process as   discussed above.    < from: TTE Echo Complete w/Doppler (07.24.19 @ 15:00) >  Summary:   1. Technically good study.   2. Normal global left ventricular systolic function.   3. Left ventricular ejection fraction, by visual estimation, is 55 to   60%.   4. Spectral Doppler shows pseudonormal pattern of left ventricular   myocardial filling (Grade II diastolic dysfunction).   5. There is severe concentric left ventricular hypertrophy.   6. Elevated mean left atrial pressure, estimated at 20 mmHg.   7. Moderately enlarged left atrium.   8. Mild mitral valve regurgitation.   9. There is no evidence of pericardial effusion.    < end of copied text >        Assessment and Plan:  In summary, BETSY COX is an 48y Male with past medical history significant for 48 year old male with history of CVA (twice last one last year) with no sequela, DM-2, HTN with LVH came to the ED saying "I think I have a stroke". As per patient's significant other, patient woke up at 1:30PM with slurred speech; he was frustrated because she could not understand what he was saying. Patient also have unsteady gait resulting in frequent falls (4 times) yesterday. Patient said he has been feeling "off" for the past 3 days but attributed it to the heat waves. During examination, patient still have slurred speech as per girlfriend, speech is getting better but not back to baseline.  MRI showed small focal diffusion abnormality if the right dorsal shawn may represents acute ischemic injury/infarct    Will D/C home  Close following as [facility] in the next two weeks
[facility]                                        Neurology at [place]                                 Aubrey Yanez, & Evelio                                  [address]                                        [place], [place], [address]                                             [phone]        CC: Stroke    HPI:   The patient is a 48y Male who reports that he went to sleep on 7/23/19 at around 2 am and awoke around 1:30 pm with slurred speech. This has been with him ever since. It is moderately severe. It has persisted since onset. In addition he reports that his gait is somewhat unsteady. There was no associated swallowing difficulty.     Interim history:  Now on 4 [place].   Still with slurred speech.     ROS:   Denies headache or dizziness.  Denies chest pain.  Denies shortness of breath.    MEDICATIONS  (STANDING):  amLODIPine   Tablet 10 milliGRAM(s) Oral daily  aspirin enteric coated 325 milliGRAM(s) Oral daily  atorvastatin 40 milliGRAM(s) Oral at bedtime  BACItracin   Ointment 1 Application(s) Topical two times a day  dextrose 5%. 1000 milliLiter(s) (50 mL/Hr) IV Continuous <Continuous>  enoxaparin Injectable 30 milliGRAM(s) SubCutaneous daily  escitalopram 20 milliGRAM(s) Oral daily  gabapentin 400 milliGRAM(s) Oral three times a day  hydrochlorothiazide 25 milliGRAM(s) Oral daily  insulin lispro (HumaLOG) corrective regimen sliding scale   SubCutaneous three times a day before meals  losartan 100 milliGRAM(s) Oral daily  metoprolol tartrate 50 milliGRAM(s) Oral two times a day  nicotine - 21 mG/24Hr(s) Patch 1 patch Transdermal daily      Vital Signs Last 24 Hrs  T(C): 36.8 (26 Jul 2019 05:03), Max: 36.9 (25 Jul 2019 18:56)  T(F): 98.3 (26 Jul 2019 05:03), Max: 98.4 (25 Jul 2019 18:56)  HR: 71 (26 Jul 2019 05:03) (63 - 71)  BP: 146/78 (26 Jul 2019 05:03) (126/60 - 146/78)  BP(mean): 99 (25 Jul 2019 17:13) (99 - 109)  RR: 18 (26 Jul 2019 05:03) (17 - 18)  SpO2: 99% (26 Jul 2019 05:03) (97% - 100%)    Detailed Neurologic Exam:    Mental status: The patient is awake and alert. There is no aphasia. There is moderate dysarthria.     Cranial nerves: Pupils equal and react symmetrically to light. There is no visual field deficit to threat. Extraocular motion is full with no nystagmus. There is no ptosis. Facial sensation is intact. Facial musculature is symmetric. Palate elevates symmetrically. Tongue is midline.    Motor: There is normal bulk and tone.  There is no tremor.  Strength grossly 5/5 bilaterally.    Sensation: Grossly intact to light touch and pin.    Reflexes: 2+ throughout and plantar responses are flexor.    Cerebellar: No dysmetria on finger nose testing.    Labs:     Cholesterol: 183  LDL: 106      Rad:   MRI brain reviewed: There is a small acute infarct in the right dorsal shawn.     MRA brain and neck and carotid duplex negative.
Patient is a 48y old  Male who presents with a chief complaint of CVA (24 Jul 2019 17:00)       INTERVAL HPI/OVERNIGHT EVENTS: Admitted yesterday with dysarthria for rule out CVA    Pt seen and examined at bedside, wife present  A&O x 3, laying in bed  No medical complaints  ROS negative  VSS    Vital Signs Last 24 Hrs  T(C): 36.3 (25 Jul 2019 08:17), Max: 37 (24 Jul 2019 21:40)  T(F): 97.3 (25 Jul 2019 08:17), Max: 98.6 (24 Jul 2019 21:40)  HR: 64 (25 Jul 2019 08:17) (62 - 68)  BP: 145/84 (25 Jul 2019 08:17) (131/75 - 161/93)  BP(mean): 100 (25 Jul 2019 08:17) (100 - 114)  RR: 15 (25 Jul 2019 08:17) (12 - 21)  SpO2: 98% (25 Jul 2019 08:17) (98% - 100%)    PHYSICAL EXAM:  GENERAL: NAD  HEAD:  Atraumatic, Normocephalic  EYES: EOMI, PERRLA, conjunctiva and sclera clear  ENMT: No teeth, poor dentition   NECK: Supple, No JVD, Normal thyroid  NERVOUS SYSTEM:  Alert & Oriented X3, Good concentration, +slurred speech, no facial droop, Motor Strength 5/5 B/L upper and lower extremities, sensation intact b/l  CHEST/LUNG: Clear to auscultation bilaterally; No rales, rhonchi, wheezing, or rubs  HEART: Regular rate and rhythm; No murmurs, rubs, or gallops  ABDOMEN: Soft, Nontender, Nondistended; Bowel sounds present  EXTREMITIES:  2+ Peripheral Pulses, No clubbing, cyanosis, or edema    LABS:    Ca    9.7        23 Jul 2019 16:22    PT/INR - ( 23 Jul 2019 16:22 )   PT: 12.0 sec;   INR: 1.04 ratio       PTT - ( 23 Jul 2019 16:22 )  PTT:36.7 sec    CAPILLARY BLOOD GLUCOSE  POCT Blood Glucose.: 172 mg/dL (25 Jul 2019 08:15)  POCT Blood Glucose.: 204 mg/dL (24 Jul 2019 22:33)  POCT Blood Glucose.: 143 mg/dL (24 Jul 2019 17:55)  POCT Blood Glucose.: 204 mg/dL (24 Jul 2019 12:11)    RADIOLOGY:    EXAM:  CT BRAIN                        PROCEDURE DATE:  07/23/2019    Impression:  1. no acute findings.
Patient s/p uncomplicated implantation of [employer] loop recorder  Loop Recorder Incision Care:     - Remove the plastic and gauze dressing after 24 hours.   - Do not touch the incision until it is completely healed.   - There is Dermabond (skin glue) on your incision, which will start to flake off on its own over the next 2-3 weeks. Do not pick at or peal off the Dermabond.   - Do not apply soaps, creams, lotions, ointments or powders to the incision until it is completely healed.  - You should call the doctor if you notice redness, drainage, swelling, increased tenderness, hot sensation around the  incision, bleeding or incision edges pulling apart.  - Follow up with Dr. Bales in 1-2 weeks time.
[facility]                                        Neurology at [place]                                 Aubrey Yanez, & Evelio                                  [address]                                        [place], [place], [address]                                             [phone]        CC: Stroke    HPI:   The patient is a 48y Male who reports that he went to sleep on 7/23/19 at around 2 am and awoke around 1:30 pm with slurred speech. This has been with him ever since. It is moderately severe. It has persisted since onset. In addition he reports that his gait is somewhat unsteady. There was no associated swallowing difficulty.     Interim history:  Now on 4 [place].   He is status post resection of metallic foreign body from right lower lip.     ROS:   Denies headache or dizziness.  Denies chest pain.  Denies shortness of breath.    MEDICATIONS  (STANDING):  amLODIPine   Tablet 10 milliGRAM(s) Oral daily  aspirin enteric coated 325 milliGRAM(s) Oral daily  atorvastatin 40 milliGRAM(s) Oral at bedtime  BACItracin   Ointment 1 Application(s) Topical two times a day  dextrose 5%. 1000 milliLiter(s) (50 mL/Hr) IV Continuous <Continuous>  enoxaparin Injectable 30 milliGRAM(s) SubCutaneous daily  escitalopram 20 milliGRAM(s) Oral daily  gabapentin 400 milliGRAM(s) Oral three times a day  hydrochlorothiazide 25 milliGRAM(s) Oral daily  insulin lispro (HumaLOG) corrective regimen sliding scale   SubCutaneous three times a day before meals  losartan 100 milliGRAM(s) Oral daily  metoprolol tartrate 50 milliGRAM(s) Oral two times a day  nicotine - 21 mG/24Hr(s) Patch 1 patch Transdermal daily      Vital Signs Last 24 Hrs  T(C): 36.3 (25 Jul 2019 08:17), Max: 37 (24 Jul 2019 21:40)  T(F): 97.3 (25 Jul 2019 08:17), Max: 98.6 (24 Jul 2019 21:40)  HR: 64 (25 Jul 2019 08:17) (62 - 68)  BP: 145/84 (25 Jul 2019 08:17) (131/75 - 161/93)  BP(mean): 100 (25 Jul 2019 08:17) (100 - 114)  RR: 15 (25 Jul 2019 08:17) (12 - 21)  SpO2: 98% (25 Jul 2019 08:17) (98% - 100%)    Detailed Neurologic Exam:    Mental status: The patient is awake and alert. There is no aphasia. There is moderate dysarthria.     Cranial nerves: Pupils equal and react symmetrically to light. There is no visual field deficit to threat. Extraocular motion is full with no nystagmus. There is no ptosis. Facial sensation is intact. Facial musculature is symmetric. Palate elevates symmetrically. Tongue is midline.    Motor: There is normal bulk and tone.  There is no tremor.  Strength grossly 5/5 bilaterally.    Sensation: Grossly intact to light touch and pin.    Reflexes: 2+ throughout and plantar responses are flexor.    Cerebellar: No dysmetria on finger nose testing.    Labs:     07-23    139  |  103  |  9.0  ----------------------------<  174<H>  3.5   |  23.0  |  1.16    Ca    9.7      23 Jul 2019 16:22    TPro  7.7  /  Alb  4.1  /  TBili  0.3<L>  /  DBili  x   /  AST  13  /  ALT  10  /  AlkPhos  82  07-23                            13.1   10.13 )-----------( 292      ( 23 Jul 2019 16:22 )             39.7       Rad:   ***

## 2019-07-27 NOTE — DISCHARGE NOTE PROVIDER - CARE PROVIDERS DIRECT ADDRESSES
,DirectAddress_Unknown,DirectAddress_Unknown,[email or web address]

## 2019-07-29 PROBLEM — I63.9 CEREBRAL INFARCTION, UNSPECIFIED: Chronic | Status: ACTIVE | Noted: 2018-06-03

## 2019-07-29 RX ORDER — GABAPENTIN 400 MG/1
400 CAPSULE ORAL 4 TIMES DAILY
Qty: 360 | Refills: 1 | Status: DISCONTINUED | COMMUNITY
Start: 2018-05-02 | End: 2019-07-29
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
## 2019-07-29 NOTE — PLAN
[FreeTextEntry7] : 8/19/2019 [FreeTextEntry3] : Patient advised to call office or report to nearest urgent care/ emergency department for abnormal signs and/or symptoms.\par

## 2019-08-02 ENCOUNTER — MEDICATION RENEWAL (OUTPATIENT)
Age: 49
End: 2019-08-02

## 2019-08-15 ENCOUNTER — RX RENEWAL (OUTPATIENT)
Age: 49
End: 2019-08-15

## 2019-08-16 ENCOUNTER — APPOINTMENT (OUTPATIENT)
Dept: FAMILY MEDICINE | Facility: CLINIC | Age: 49
End: 2019-08-16
Payer: SELF-PAY

## 2019-08-16 VITALS
SYSTOLIC BLOOD PRESSURE: 125 MMHG | RESPIRATION RATE: 18 BRPM | OXYGEN SATURATION: 97 % | DIASTOLIC BLOOD PRESSURE: 81 MMHG | HEART RATE: 71 BPM | TEMPERATURE: 98.2 F

## 2019-08-16 VITALS — HEIGHT: 74 IN | WEIGHT: 254 LBS | BODY MASS INDEX: 32.6 KG/M2

## 2019-08-16 DIAGNOSIS — Z09 ENCOUNTER FOR FOLLOW-UP EXAMINATION AFTER COMPLETED TREATMENT FOR CONDITIONS OTHER THAN MALIGNANT NEOPLASM: ICD-10-CM

## 2019-08-16 LAB — HBA1C MFR BLD HPLC: 9.1

## 2019-08-16 PROCEDURE — 83036 HEMOGLOBIN GLYCOSYLATED A1C: CPT | Mod: QW

## 2019-08-16 PROCEDURE — 99495 TRANSJ CARE MGMT MOD F2F 14D: CPT | Mod: 25

## 2019-08-16 PROCEDURE — 36415 COLL VENOUS BLD VENIPUNCTURE: CPT

## 2019-08-16 RX ORDER — ESCITALOPRAM OXALATE 10 MG/1
10 TABLET ORAL
Qty: 30 | Refills: 0 | Status: DISCONTINUED | COMMUNITY
Start: 2018-09-03 | End: 2019-08-16

## 2019-08-16 RX ORDER — GABAPENTIN 300 MG
300 TABLET ORAL
Refills: 0 | Status: ACTIVE | COMMUNITY

## 2019-08-16 RX ORDER — IBUPROFEN 800 MG/1
800 TABLET, FILM COATED ORAL 3 TIMES DAILY
Qty: 90 | Refills: 3 | Status: DISCONTINUED | COMMUNITY
Start: 2018-10-29 | End: 2019-08-16

## 2019-08-16 RX ORDER — INSULIN HUMAN 100 [IU]/ML
100 INJECTION, SUSPENSION SUBCUTANEOUS
Qty: 3 | Refills: 0 | Status: DISCONTINUED | COMMUNITY
Start: 2018-10-26 | End: 2019-08-16

## 2019-08-16 RX ORDER — ASPIRIN 325 MG/1
325 TABLET, FILM COATED ORAL DAILY
Qty: 90 | Refills: 1 | Status: DISCONTINUED | COMMUNITY
Start: 2018-06-14 | End: 2019-08-16

## 2019-08-16 RX ORDER — INSULIN GLARGINE 100 [IU]/ML
100 INJECTION, SOLUTION SUBCUTANEOUS
Qty: 2 | Refills: 3 | Status: DISCONTINUED | COMMUNITY
Start: 2019-03-07 | End: 2019-08-16

## 2019-08-16 NOTE — PHYSICAL EXAM
[No Acute Distress] : no acute distress [Well Nourished] : well nourished [Well Developed] : well developed [Looks Tired] : appears tired [Chronically Ill] : chronically ill [Clothing disheveled] : disheveled clothing [Grooming Unkempt] : unkempt appearance [Tremulous] : was tremulous [Normal Sclera/Conjunctiva] : normal sclera/conjunctiva [PERRL] : pupils equal round and reactive to light [EOMI] : extraocular movements intact [Normal Outer Ear/Nose] : the outer ears and nose were normal in appearance [Normal Oropharynx] : the oropharynx was normal [No JVD] : no jugular venous distention [No Lymphadenopathy] : no lymphadenopathy [Supple] : supple [Thyroid Normal, No Nodules] : the thyroid was normal and there were no nodules present [No Respiratory Distress] : no respiratory distress  [No Accessory Muscle Use] : no accessory muscle use [Clear to Auscultation] : lungs were clear to auscultation bilaterally [Normal Rate] : normal rate  [Normal S1, S2] : normal S1 and S2 [Regular Rhythm] : with a regular rhythm [No Murmur] : no murmur heard [No Carotid Bruits] : no carotid bruits [No Abdominal Bruit] : a ~M bruit was not heard ~T in the abdomen [No Varicosities] : no varicosities [Pedal Pulses Present] : the pedal pulses are present [No Edema] : there was no peripheral edema [No Palpable Aorta] : no palpable aorta [No Extremity Clubbing/Cyanosis] : no extremity clubbing/cyanosis [Soft] : abdomen soft [Non Tender] : non-tender [Non-distended] : non-distended [No Masses] : no abdominal mass palpated [No HSM] : no HSM [Normal Posterior Cervical Nodes] : no posterior cervical lymphadenopathy [Normal Bowel Sounds] : normal bowel sounds [Normal Anterior Cervical Nodes] : no anterior cervical lymphadenopathy [No CVA Tenderness] : no CVA  tenderness [No Spinal Tenderness] : no spinal tenderness [No Joint Swelling] : no joint swelling [Grossly Normal Strength/Tone] : grossly normal strength/tone [Coordination Grossly Intact] : coordination grossly intact [No Rash] : no rash [Normal Gait] : normal gait [No Focal Deficits] : no focal deficits [Deep Tendon Reflexes (DTR)] : deep tendon reflexes were 2+ and symmetric [Normal Affect] : the affect was normal [Normal Insight/Judgement] : insight and judgment were intact [56136 - Moderate Complexity requires multiple possible diagnoses and/or the management options, moderate complexity of the medical data (tests, etc.) to be reviewed, and moderate risk of significant complications, morbidity, and/or mortality as well as co] : Moderate Complexity
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
## 2019-08-16 NOTE — HEALTH RISK ASSESSMENT
[] : Yes [No] : In the past 12 months have you used drugs other than those required for medical reasons? No [Two or more falls in past year] : Patient reported two or more falls in the past year [1] : 2) Feeling down, depressed, or hopeless for several days (1) [Change in mental status noted] : Change in mental status noted [Behavior] : difficulty with behavior [Language] : difficulty with language [Learning/Retaining New Information] : difficulty learning/retaining new information [Handling Complex Tasks] : difficulty handling complex tasks [Reasoning] : difficulty with reasoning [Behavioral] : behavioral [Spatial Ability and Orientation] : difficulty with spatial ability and orientation [With Significant Other] : lives with significant other [Single] : single [On disability] : on disability [Feels Safe at Home] : Feels safe at home [Independent] : feeding [Some assistance needed] : using telephone [Full assistance needed] : managing finances [Smoke Detector] : smoke detector [Seat Belt] :  uses seat belt [With Patient/Caregiver] : With Patient/Caregiver [Designated Healthcare Proxy] : Designated healthcare proxy [Name: ___] : Health Care Proxy's Name: [unfilled]  [Relationship: ___] : Relationship: [unfilled] [HIV test declined] : HIV test declined [Hepatitis C test declined] : Hepatitis C test declined [de-identified] : 1-2 cig/ day [Sexually Active] : not sexually active [Reports changes in hearing] : Reports no changes in hearing [Reports changes in vision] : Reports no changes in vision [Reports changes in dental health] : Reports no changes in dental health [AdvancecareDate] : 8/16/19

## 2019-08-16 NOTE — COUNSELING
[Fall prevention counseling provided] : Fall prevention counseling provided [Behavioral health counseling provided] : Behavioral health counseling provided [Risk of tobacco use and health benefits of smoking cessation discussed] : Risk of tobacco use and health benefits of smoking cessation discussed [Encouraged to pick a quit date and identify support needed to quit] : Encouraged to pick a quit date and identify support needed to quit [None] : None [Good understanding] : Patient has a good understanding of lifestyle changes and steps needed to achieve self management goal

## 2019-08-16 NOTE — ASSESSMENT
[FreeTextEntry1] : 47 y/o M, non compliant w/ meds and f/up presents today s/p Hospitalization:\par \par CVA:\par -Previous Stroke 6/2018:\par Now on Plavix and atorvastatin..\par Seen by cardiology, Dr Reynolds.PET scan pending\par pending Neurology, Dr Josselyn sahu.\par Pt on PT, Speech therapy\par \par -HTN: well control.\par On amlodipine 10 mg and metoprolol to 50mg bid.\par and Losartan/HCTZ 100mg-25mg.\par \par -Anxiety: \par on lexapro to 20mg daily.\par \par -Insomnia:\par on Trazodone\par \par -IDDM: HbA1c: 9.5%\par On Lantus 30 untis daily.// \par Endocrino referral given several times in the past.\par \par -Diabetic Neuropathy: on gabapentin 400mg tid.\par \par -Chronic pain:\par Pt was advise no narcotis will be Rx. + cocaine in urine 6/2018.\par \par -Neurology, Endocrinology, Podiatry and Cardiology referral given in the past: Non compliant.\par \par -Complete blood test done today\par \par

## 2019-08-26 LAB
ALBUMIN SERPL ELPH-MCNC: 4.2 G/DL
ALP BLD-CCNC: 88 U/L
ALT SERPL-CCNC: 12 U/L
ANION GAP SERPL CALC-SCNC: 15 MMOL/L
AST SERPL-CCNC: 9 U/L
BASOPHILS # BLD AUTO: 0.05 K/UL
BASOPHILS NFR BLD AUTO: 0.5 %
BILIRUB SERPL-MCNC: <0.2 MG/DL
BUN SERPL-MCNC: 16 MG/DL
CALCIUM SERPL-MCNC: 9.3 MG/DL
CHLORIDE SERPL-SCNC: 101 MMOL/L
CHOLEST SERPL-MCNC: 94 MG/DL
CHOLEST/HDLC SERPL: 3.5 RATIO
CO2 SERPL-SCNC: 23 MMOL/L
CREAT SERPL-MCNC: 1.04 MG/DL
EOSINOPHIL # BLD AUTO: 0.27 K/UL
EOSINOPHIL NFR BLD AUTO: 2.6 %
GLUCOSE SERPL-MCNC: 214 MG/DL
HCT VFR BLD CALC: 38.6 %
HDLC SERPL-MCNC: 27 MG/DL
HGB BLD-MCNC: 12.4 G/DL
IMM GRANULOCYTES NFR BLD AUTO: 0.3 %
LDLC SERPL CALC-MCNC: 23 MG/DL
LYMPHOCYTES # BLD AUTO: 2.6 K/UL
LYMPHOCYTES NFR BLD AUTO: 24.7 %
MAN DIFF?: NORMAL
MCHC RBC-ENTMCNC: 29.5 PG
MCHC RBC-ENTMCNC: 32.1 GM/DL
MCV RBC AUTO: 91.9 FL
MONOCYTES # BLD AUTO: 1.08 K/UL
MONOCYTES NFR BLD AUTO: 10.2 %
NEUTROPHILS # BLD AUTO: 6.51 K/UL
NEUTROPHILS NFR BLD AUTO: 61.7 %
PLATELET # BLD AUTO: 275 K/UL
POTASSIUM SERPL-SCNC: 3.5 MMOL/L
PROT SERPL-MCNC: 7.3 G/DL
RBC # BLD: 4.2 M/UL
RBC # FLD: 13.4 %
SODIUM SERPL-SCNC: 139 MMOL/L
TRIGL SERPL-MCNC: 222 MG/DL
WBC # FLD AUTO: 10.54 K/UL

## 2019-09-29 PROCEDURE — 87449 NOS EACH ORGANISM AG IA: CPT

## 2019-09-29 PROCEDURE — 87798 DETECT AGENT NOS DNA AMP: CPT

## 2019-09-29 PROCEDURE — 93325 DOPPLER ECHO COLOR FLOW MAPG: CPT

## 2019-09-29 PROCEDURE — C1764: CPT

## 2019-09-29 PROCEDURE — 70150 X-RAY EXAM OF FACIAL BONES: CPT

## 2019-09-29 PROCEDURE — 86900 BLOOD TYPING SEROLOGIC ABO: CPT

## 2019-09-29 PROCEDURE — 87581 M.PNEUMON DNA AMP PROBE: CPT

## 2019-09-29 PROCEDURE — 70551 MRI BRAIN STEM W/O DYE: CPT

## 2019-09-29 PROCEDURE — 70544 MR ANGIOGRAPHY HEAD W/O DYE: CPT

## 2019-09-29 PROCEDURE — 97110 THERAPEUTIC EXERCISES: CPT

## 2019-09-29 PROCEDURE — 87486 CHLMYD PNEUM DNA AMP PROBE: CPT

## 2019-09-29 PROCEDURE — 86850 RBC ANTIBODY SCREEN: CPT

## 2019-09-29 PROCEDURE — 83036 HEMOGLOBIN GLYCOSYLATED A1C: CPT

## 2019-09-29 PROCEDURE — 93306 TTE W/DOPPLER COMPLETE: CPT

## 2019-09-29 PROCEDURE — 71045 X-RAY EXAM CHEST 1 VIEW: CPT

## 2019-09-29 PROCEDURE — 93320 DOPPLER ECHO COMPLETE: CPT

## 2019-09-29 PROCEDURE — 36415 COLL VENOUS BLD VENIPUNCTURE: CPT

## 2019-09-29 PROCEDURE — 99285 EMERGENCY DEPT VISIT HI MDM: CPT | Mod: 25

## 2019-09-29 PROCEDURE — 93880 EXTRACRANIAL BILAT STUDY: CPT

## 2019-09-29 PROCEDURE — 70547 MR ANGIOGRAPHY NECK W/O DYE: CPT

## 2019-09-29 PROCEDURE — 85027 COMPLETE CBC AUTOMATED: CPT

## 2019-09-29 PROCEDURE — 82550 ASSAY OF CK (CPK): CPT

## 2019-09-29 PROCEDURE — 87633 RESP VIRUS 12-25 TARGETS: CPT

## 2019-09-29 PROCEDURE — 82962 GLUCOSE BLOOD TEST: CPT

## 2019-09-29 PROCEDURE — 70450 CT HEAD/BRAIN W/O DYE: CPT

## 2019-09-29 PROCEDURE — 80053 COMPREHEN METABOLIC PANEL: CPT

## 2019-09-29 PROCEDURE — 84484 ASSAY OF TROPONIN QUANT: CPT

## 2019-09-29 PROCEDURE — 92523 SPEECH SOUND LANG COMPREHEN: CPT

## 2019-09-29 PROCEDURE — 33285 INSJ SUBQ CAR RHYTHM MNTR: CPT

## 2019-09-29 PROCEDURE — 97167 OT EVAL HIGH COMPLEX 60 MIN: CPT

## 2019-09-29 PROCEDURE — 85610 PROTHROMBIN TIME: CPT

## 2019-09-29 PROCEDURE — 85730 THROMBOPLASTIN TIME PARTIAL: CPT

## 2019-09-29 PROCEDURE — 96360 HYDRATION IV INFUSION INIT: CPT

## 2019-09-29 PROCEDURE — 80048 BASIC METABOLIC PNL TOTAL CA: CPT

## 2019-09-29 PROCEDURE — 93312 ECHO TRANSESOPHAGEAL: CPT

## 2019-09-29 PROCEDURE — 80061 LIPID PANEL: CPT

## 2019-09-29 PROCEDURE — 86901 BLOOD TYPING SEROLOGIC RH(D): CPT

## 2019-09-29 PROCEDURE — 97163 PT EVAL HIGH COMPLEX 45 MIN: CPT

## 2019-09-29 PROCEDURE — 92507 TX SP LANG VOICE COMM INDIV: CPT

## 2019-09-29 PROCEDURE — 97116 GAIT TRAINING THERAPY: CPT

## 2019-09-29 PROCEDURE — 93005 ELECTROCARDIOGRAM TRACING: CPT

## 2019-10-09 NOTE — ED PROVIDER NOTE - NIH STROKE SCALE: 9. BEST LANGUAGE
Problem: Pressure Injury, Risk for  Intervention: # Turn/reposition patient q 2 hours  Select this intervention if patient requires turning/repositioning assistance.     10/08/19 3799   Activity and Safety   Positioning Turned Q 2 hours   Pt given multiple pillows for repositioning and alleviating manuel prominences.         (1) Mild-to-moderate aphasia; some obvious loss of fluency or facility of comprehension, w/o significant limitation on ideas expressed or form of expression. Reduction of speech and/or comprehension, however, makes conversation about provided material difficult or impossible.  For example, in conversation about provided materials, examiner can identify picture or naming card content from patient's response.

## 2019-12-06 ENCOUNTER — APPOINTMENT (OUTPATIENT)
Dept: FAMILY MEDICINE | Facility: CLINIC | Age: 49
End: 2019-12-06

## 2020-03-09 ENCOUNTER — APPOINTMENT (OUTPATIENT)
Dept: FAMILY MEDICINE | Facility: CLINIC | Age: 50
End: 2020-03-09
Payer: SELF-PAY

## 2020-03-09 VITALS
RESPIRATION RATE: 16 BRPM | BODY MASS INDEX: 32.08 KG/M2 | TEMPERATURE: 98.4 F | HEART RATE: 77 BPM | SYSTOLIC BLOOD PRESSURE: 148 MMHG | OXYGEN SATURATION: 99 % | HEIGHT: 74 IN | WEIGHT: 250 LBS | DIASTOLIC BLOOD PRESSURE: 100 MMHG

## 2020-03-09 VITALS — SYSTOLIC BLOOD PRESSURE: 130 MMHG | DIASTOLIC BLOOD PRESSURE: 87 MMHG

## 2020-03-09 DIAGNOSIS — I63.9 CEREBRAL INFARCTION, UNSPECIFIED: ICD-10-CM

## 2020-03-09 DIAGNOSIS — I10 ESSENTIAL (PRIMARY) HYPERTENSION: ICD-10-CM

## 2020-03-09 DIAGNOSIS — E11.65 TYPE 2 DIABETES MELLITUS WITH HYPERGLYCEMIA: ICD-10-CM

## 2020-03-09 PROCEDURE — 99214 OFFICE O/P EST MOD 30 MIN: CPT | Mod: 25

## 2020-03-09 PROCEDURE — 36415 COLL VENOUS BLD VENIPUNCTURE: CPT

## 2020-03-09 PROCEDURE — 83036 HEMOGLOBIN GLYCOSYLATED A1C: CPT | Mod: QW

## 2020-03-09 RX ORDER — INSULIN GLARGINE 100 [IU]/ML
100 INJECTION, SOLUTION SUBCUTANEOUS
Qty: 1 | Refills: 3 | Status: ACTIVE | COMMUNITY
Start: 2019-08-16 | End: 1900-01-01

## 2020-03-09 RX ORDER — INSULIN LISPRO 100 [IU]/ML
100 INJECTION, SOLUTION INTRAVENOUS; SUBCUTANEOUS
Qty: 1 | Refills: 2 | Status: ACTIVE | COMMUNITY
Start: 2020-03-09 | End: 1900-01-01

## 2020-03-09 RX ORDER — OXYCODONE AND ACETAMINOPHEN 5; 325 MG/1; MG/1
5-325 TABLET ORAL EVERY 8 HOURS
Qty: 10 | Refills: 0 | Status: DISCONTINUED | COMMUNITY
Start: 1900-01-01 | End: 2020-03-09

## 2020-03-09 NOTE — ASSESSMENT
[FreeTextEntry1] : 48 y/o M, non compliant w/ meds and f/up presents today \par \par CVA:\par -Previous Stroke 6/2018:\par Now on Plavix and atorvastatin..\par Seen by cardiology, Dr Reynolds.PET scan pending\par pending Neurology, Dr Josselyn sahu.\par \par -HTN: better control.\par On amlodipine 10 mg and metoprolol to 50mg bid.\par and Losartan/HCTZ 100mg-25mg.\par \par -Anxiety: \par on lexapro to 20mg daily.\par \par -Insomnia:\par on Trazodone\par \par -IDDM: HbA1c: 9.3%\par On Lantus 30 untis daily.// \par On metformin bid\par Start Humalog 12 units before each meal\par Endocrino referral given several times in the past.> never done\par \par -Diabetic Neuropathy: on gabapentin 400mg tid.\par \par -Chronic pain:\par Pt was advise no narcotis will be Rx.\par  + cocaine in urine 6/2018.\par \par -Neurology, Endocrinology, Podiatry and Cardiology referral given in the past: Non compliant.\par \par -blood test today\par . \par

## 2020-03-09 NOTE — PHYSICAL EXAM
[No Acute Distress] : no acute distress [Well Developed] : well developed [Normal Sclera/Conjunctiva] : normal sclera/conjunctiva [PERRL] : pupils equal round and reactive to light [EOMI] : extraocular movements intact [Normal Outer Ear/Nose] : the outer ears and nose were normal in appearance [Normal Oropharynx] : the oropharynx was normal [No JVD] : no jugular venous distention [No Lymphadenopathy] : no lymphadenopathy [Supple] : supple [Thyroid Normal, No Nodules] : the thyroid was normal and there were no nodules present [No Respiratory Distress] : no respiratory distress  [No Accessory Muscle Use] : no accessory muscle use [Clear to Auscultation] : lungs were clear to auscultation bilaterally [Normal Rate] : normal rate  [Regular Rhythm] : with a regular rhythm [Normal S1, S2] : normal S1 and S2 [No Murmur] : no murmur heard [No Carotid Bruits] : no carotid bruits [No Abdominal Bruit] : a ~M bruit was not heard ~T in the abdomen [No Varicosities] : no varicosities [Pedal Pulses Present] : the pedal pulses are present [No Edema] : there was no peripheral edema [No Palpable Aorta] : no palpable aorta [No Extremity Clubbing/Cyanosis] : no extremity clubbing/cyanosis [Soft] : abdomen soft [Non Tender] : non-tender [Non-distended] : non-distended [No Masses] : no abdominal mass palpated [No HSM] : no HSM [Normal Bowel Sounds] : normal bowel sounds [Normal Posterior Cervical Nodes] : no posterior cervical lymphadenopathy [Normal Anterior Cervical Nodes] : no anterior cervical lymphadenopathy [No CVA Tenderness] : no CVA  tenderness [No Spinal Tenderness] : no spinal tenderness [No Joint Swelling] : no joint swelling [Grossly Normal Strength/Tone] : grossly normal strength/tone [No Rash] : no rash [No Focal Deficits] : no focal deficits [Normal Gait] : normal gait [Deep Tendon Reflexes (DTR)] : deep tendon reflexes were 2+ and symmetric [Normal Affect] : the affect was normal [Normal Insight/Judgement] : insight and judgment were intact [de-identified] : dishelved [de-identified] : poor dentition [de-identified] : ppor coordination, dysarthria.

## 2020-03-09 NOTE — HISTORY OF PRESENT ILLNESS
[Other: _____] : [unfilled] [FreeTextEntry1] : F/up DM, Meds refills [de-identified] : 48 y/o male with PMH of CVA (twice 2018 and 3rd in 2019) with Dysarthria as sequela,Uncontrolled DM on Lantus, uncontrolled HTN for long time, now on Amlodipine,Losartan-HCTZ, on Atorvastatin 40mg, plavix,.\par Pt with hx Drug abuse. Now on Lexapro and Trazodone.\par Pt received at home PT , OT and speech therapy.\par Seen by Neurology, patient has recurrent stroke, cardiology consulted and recommended TTE and Loop recorder placed and discharged with plavix and statin. \par According to partner, pt with no insurance unable to be seen by neurology. States was seen by Cardiology in clinic.

## 2020-03-09 NOTE — HEALTH RISK ASSESSMENT
[] : Yes [No] : In the past 12 months have you used drugs other than those required for medical reasons? No [No falls in past year] : Patient reported no falls in the past year [de-identified] : <5 cig/day [de-identified] : no [de-identified] : no sweets

## 2020-03-10 LAB
ALBUMIN SERPL ELPH-MCNC: 4.2 G/DL
ALP BLD-CCNC: 75 U/L
ALT SERPL-CCNC: 22 U/L
ANION GAP SERPL CALC-SCNC: 15 MMOL/L
AST SERPL-CCNC: 13 U/L
BILIRUB SERPL-MCNC: <0.2 MG/DL
BUN SERPL-MCNC: 13 MG/DL
CALCIUM SERPL-MCNC: 9.3 MG/DL
CHLORIDE SERPL-SCNC: 100 MMOL/L
CHOLEST SERPL-MCNC: 174 MG/DL
CHOLEST/HDLC SERPL: 5.8 RATIO
CO2 SERPL-SCNC: 21 MMOL/L
CREAT SERPL-MCNC: 0.89 MG/DL
GLUCOSE SERPL-MCNC: 358 MG/DL
HBA1C MFR BLD HPLC: 9.3
HDLC SERPL-MCNC: 30 MG/DL
LDLC SERPL CALC-MCNC: 70 MG/DL
POTASSIUM SERPL-SCNC: 4.4 MMOL/L
PROT SERPL-MCNC: 7.2 G/DL
SODIUM SERPL-SCNC: 136 MMOL/L
TRIGL SERPL-MCNC: 366 MG/DL

## 2020-06-12 ENCOUNTER — EMERGENCY (EMERGENCY)
Facility: HOSPITAL | Age: 50
LOS: 1 days | Discharge: HOME | End: 2020-06-12
Attending: EMERGENCY MEDICINE
Payer: SELF-PAY

## 2020-06-12 VITALS
HEIGHT: 74 IN | SYSTOLIC BLOOD PRESSURE: 157 MMHG | HEART RATE: 79 BPM | RESPIRATION RATE: 18 BRPM | TEMPERATURE: 98 F | WEIGHT: 250 LBS | OXYGEN SATURATION: 97 % | DIASTOLIC BLOOD PRESSURE: 86 MMHG

## 2020-06-12 LAB
ALBUMIN SERPL ELPH-MCNC: 4.1 G/DL — SIGNIFICANT CHANGE UP (ref 3.3–5.2)
ALP SERPL-CCNC: 77 U/L — SIGNIFICANT CHANGE UP (ref 40–120)
ALT FLD-CCNC: 19 U/L — SIGNIFICANT CHANGE UP
ANION GAP SERPL CALC-SCNC: 14 MMOL/L — SIGNIFICANT CHANGE UP (ref 5–17)
AST SERPL-CCNC: 13 U/L — SIGNIFICANT CHANGE UP
BASOPHILS # BLD AUTO: 0.05 K/UL — SIGNIFICANT CHANGE UP (ref 0–0.2)
BASOPHILS NFR BLD AUTO: 0.5 % — SIGNIFICANT CHANGE UP (ref 0–2)
BILIRUB SERPL-MCNC: <0.2 MG/DL — LOW (ref 0.4–2)
BUN SERPL-MCNC: 14 MG/DL — SIGNIFICANT CHANGE UP (ref 8–20)
CALCIUM SERPL-MCNC: 9.6 MG/DL — SIGNIFICANT CHANGE UP (ref 8.6–10.2)
CHLORIDE SERPL-SCNC: 99 MMOL/L — SIGNIFICANT CHANGE UP (ref 98–107)
CO2 SERPL-SCNC: 23 MMOL/L — SIGNIFICANT CHANGE UP (ref 22–29)
CREAT SERPL-MCNC: 1.02 MG/DL — SIGNIFICANT CHANGE UP (ref 0.5–1.3)
EOSINOPHIL # BLD AUTO: 0.2 K/UL — SIGNIFICANT CHANGE UP (ref 0–0.5)
EOSINOPHIL NFR BLD AUTO: 2.1 % — SIGNIFICANT CHANGE UP (ref 0–6)
GLUCOSE SERPL-MCNC: 360 MG/DL — HIGH (ref 70–99)
HCT VFR BLD CALC: 38.9 % — LOW (ref 39–50)
HGB BLD-MCNC: 13 G/DL — SIGNIFICANT CHANGE UP (ref 13–17)
IMM GRANULOCYTES NFR BLD AUTO: 0.5 % — SIGNIFICANT CHANGE UP (ref 0–1.5)
LYMPHOCYTES # BLD AUTO: 3.05 K/UL — SIGNIFICANT CHANGE UP (ref 1–3.3)
LYMPHOCYTES # BLD AUTO: 32.3 % — SIGNIFICANT CHANGE UP (ref 13–44)
MAGNESIUM SERPL-MCNC: 1.8 MG/DL — SIGNIFICANT CHANGE UP (ref 1.6–2.6)
MCHC RBC-ENTMCNC: 30.1 PG — SIGNIFICANT CHANGE UP (ref 27–34)
MCHC RBC-ENTMCNC: 33.4 GM/DL — SIGNIFICANT CHANGE UP (ref 32–36)
MCV RBC AUTO: 90 FL — SIGNIFICANT CHANGE UP (ref 80–100)
MONOCYTES # BLD AUTO: 0.99 K/UL — HIGH (ref 0–0.9)
MONOCYTES NFR BLD AUTO: 10.5 % — SIGNIFICANT CHANGE UP (ref 2–14)
NEUTROPHILS # BLD AUTO: 5.09 K/UL — SIGNIFICANT CHANGE UP (ref 1.8–7.4)
NEUTROPHILS NFR BLD AUTO: 54.1 % — SIGNIFICANT CHANGE UP (ref 43–77)
NT-PROBNP SERPL-SCNC: 95 PG/ML — SIGNIFICANT CHANGE UP (ref 0–300)
PLATELET # BLD AUTO: 258 K/UL — SIGNIFICANT CHANGE UP (ref 150–400)
POTASSIUM SERPL-MCNC: 3.7 MMOL/L — SIGNIFICANT CHANGE UP (ref 3.5–5.3)
POTASSIUM SERPL-SCNC: 3.7 MMOL/L — SIGNIFICANT CHANGE UP (ref 3.5–5.3)
PROT SERPL-MCNC: 7.7 G/DL — SIGNIFICANT CHANGE UP (ref 6.6–8.7)
RBC # BLD: 4.32 M/UL — SIGNIFICANT CHANGE UP (ref 4.2–5.8)
RBC # FLD: 12.7 % — SIGNIFICANT CHANGE UP (ref 10.3–14.5)
SODIUM SERPL-SCNC: 136 MMOL/L — SIGNIFICANT CHANGE UP (ref 135–145)
TROPONIN T SERPL-MCNC: <0.01 NG/ML — SIGNIFICANT CHANGE UP (ref 0–0.06)
WBC # BLD: 9.43 K/UL — SIGNIFICANT CHANGE UP (ref 3.8–10.5)
WBC # FLD AUTO: 9.43 K/UL — SIGNIFICANT CHANGE UP (ref 3.8–10.5)

## 2020-06-12 PROCEDURE — 71046 X-RAY EXAM CHEST 2 VIEWS: CPT | Mod: 26

## 2020-06-12 PROCEDURE — 70450 CT HEAD/BRAIN W/O DYE: CPT | Mod: 26

## 2020-06-12 PROCEDURE — 99285 EMERGENCY DEPT VISIT HI MDM: CPT

## 2020-06-12 PROCEDURE — 93010 ELECTROCARDIOGRAM REPORT: CPT

## 2020-06-12 NOTE — ED PROVIDER NOTE - CLINICAL SUMMARY MEDICAL DECISION MAKING FREE TEXT BOX
Pt p/w chest tightness, arm and leg tingling. Well appearing, no weakness. Given CVA hx will obtain CT head, rule out ACS, likely place in CDU for MRI. Pt p/w chest tightness, arm and leg tingling. symptoms improviong. Well appearing, no weakness. Given CVA hx will obtain CT head, rule out ACS, likely place in CDU for MRI. not in tpa window

## 2020-06-12 NOTE — ED PROVIDER NOTE - NS ED ATTENDING STATEMENT MOD
· Patient had MRSA UTI in 10/2019 and S[place]ia UTI 1/2020  · Received 3 L NSS in ER, cultures and lactate sent, will trend  · Patient started on Rocephin, but will add Vanco secondary to H/O MRSA  · 03/16 Urine culture results: >100,000 cfu/ml Pseudomonas aeruginosa, >100,000 cfu/ml Morganella morganii, >100,000 cfu/ml Enterococcus faecalis  · ABX changed to cefepime  Continue vanc  Sensitivity still pending    · Pharmacy consulted for vanc dosing Attending Only

## 2020-06-12 NOTE — ED PROVIDER NOTE - PROGRESS NOTE DETAILS
AJM: pt feeling improved. will send to CDU for mri for further evaluation. signed out to SKYLAR Coon AJM: pt feeling improved. will send to CDU for mri for further evaluation given abnormality noted on ct head. signed out to SKYLAR Coon

## 2020-06-12 NOTE — ED PROVIDER NOTE - NEUROLOGICAL, MLM
CN 2-12 intact, No tremors. No fasciculations. No nystagmus. Normal finger to nose and heel to shin b/l. left leg and left arm tingling.  Normal gait.

## 2020-06-12 NOTE — ED PROVIDER NOTE - OBJECTIVE STATEMENT
48y/o M with PMHx of CVA (multiple), DM, HTN presents to the ED c/o sudden onset LUE tingling and numbness radiating to left leg which began at 1400. He additionally c/o chest tightness which began a half hr PTA. According to pt. wife noticed change in speech around 1700, although pt states that his speech never changed. Denies fever. 50y/o M with PMHx of CVA (multiple), DM, HTN presents to the ED c/o sudden onset LUE tingling and numbness radiating to left leg which began at 1400. He additionally c/o chest tightness which began a half hr PTA. Denies any weakness, changes in vision, focal weakness. No trauma. No fevers or coughing. No AMS. No neck pain. No n/v/d/abd pain. No sob or syncope.

## 2020-06-12 NOTE — ED ADULT TRIAGE NOTE - CHIEF COMPLAINT QUOTE
patient alert and oriented x 4 COULTER x 4 in no distress family states that he has been having tingling to left hand and arm with tingling to left leg, has HX of Multiple Strokes, started at 1530, also complaining of CHest pain with tightness started 20 minutes ago

## 2020-06-13 LAB
GLUCOSE BLDC GLUCOMTR-MCNC: 230 MG/DL — HIGH (ref 70–99)
GLUCOSE BLDC GLUCOMTR-MCNC: 235 MG/DL — HIGH (ref 70–99)
GLUCOSE BLDC GLUCOMTR-MCNC: 312 MG/DL — HIGH (ref 70–99)
TROPONIN T SERPL-MCNC: <0.01 NG/ML — SIGNIFICANT CHANGE UP (ref 0–0.06)
TROPONIN T SERPL-MCNC: <0.01 NG/ML — SIGNIFICANT CHANGE UP (ref 0–0.06)

## 2020-06-13 PROCEDURE — 93010 ELECTROCARDIOGRAM REPORT: CPT | Mod: 76

## 2020-06-13 PROCEDURE — 99220: CPT

## 2020-06-13 RX ORDER — DEXTROSE 50 % IN WATER 50 %
25 SYRINGE (ML) INTRAVENOUS ONCE
Refills: 0 | Status: DISCONTINUED | OUTPATIENT
Start: 2020-06-13 | End: 2020-06-17

## 2020-06-13 RX ORDER — METFORMIN HYDROCHLORIDE 850 MG/1
850 TABLET ORAL
Refills: 0 | Status: DISCONTINUED | OUTPATIENT
Start: 2020-06-13 | End: 2020-06-17

## 2020-06-13 RX ORDER — LOSARTAN POTASSIUM 100 MG/1
100 TABLET, FILM COATED ORAL DAILY
Refills: 0 | Status: DISCONTINUED | OUTPATIENT
Start: 2020-06-13 | End: 2020-06-17

## 2020-06-13 RX ORDER — METOPROLOL TARTRATE 50 MG
50 TABLET ORAL
Refills: 0 | Status: DISCONTINUED | OUTPATIENT
Start: 2020-06-13 | End: 2020-06-17

## 2020-06-13 RX ORDER — GABAPENTIN 400 MG/1
300 CAPSULE ORAL THREE TIMES A DAY
Refills: 0 | Status: DISCONTINUED | OUTPATIENT
Start: 2020-06-13 | End: 2020-06-17

## 2020-06-13 RX ORDER — DEXTROSE 50 % IN WATER 50 %
15 SYRINGE (ML) INTRAVENOUS ONCE
Refills: 0 | Status: DISCONTINUED | OUTPATIENT
Start: 2020-06-13 | End: 2020-06-17

## 2020-06-13 RX ORDER — OXYCODONE AND ACETAMINOPHEN 5; 325 MG/1; MG/1
1 TABLET ORAL EVERY 6 HOURS
Refills: 0 | Status: DISCONTINUED | OUTPATIENT
Start: 2020-06-13 | End: 2020-06-13

## 2020-06-13 RX ORDER — DEXTROSE 50 % IN WATER 50 %
12.5 SYRINGE (ML) INTRAVENOUS ONCE
Refills: 0 | Status: DISCONTINUED | OUTPATIENT
Start: 2020-06-13 | End: 2020-06-17

## 2020-06-13 RX ORDER — ESCITALOPRAM OXALATE 10 MG/1
20 TABLET, FILM COATED ORAL DAILY
Refills: 0 | Status: DISCONTINUED | OUTPATIENT
Start: 2020-06-13 | End: 2020-06-17

## 2020-06-13 RX ORDER — FAMOTIDINE 10 MG/ML
20 INJECTION INTRAVENOUS DAILY
Refills: 0 | Status: DISCONTINUED | OUTPATIENT
Start: 2020-06-13 | End: 2020-06-17

## 2020-06-13 RX ORDER — SODIUM CHLORIDE 9 MG/ML
1000 INJECTION, SOLUTION INTRAVENOUS
Refills: 0 | Status: DISCONTINUED | OUTPATIENT
Start: 2020-06-13 | End: 2020-06-17

## 2020-06-13 RX ORDER — GLUCAGON INJECTION, SOLUTION 0.5 MG/.1ML
1 INJECTION, SOLUTION SUBCUTANEOUS ONCE
Refills: 0 | Status: DISCONTINUED | OUTPATIENT
Start: 2020-06-13 | End: 2020-06-17

## 2020-06-13 RX ORDER — AMLODIPINE BESYLATE 2.5 MG/1
10 TABLET ORAL DAILY
Refills: 0 | Status: DISCONTINUED | OUTPATIENT
Start: 2020-06-13 | End: 2020-06-17

## 2020-06-13 RX ORDER — HYDROCHLOROTHIAZIDE 25 MG
25 TABLET ORAL DAILY
Refills: 0 | Status: DISCONTINUED | OUTPATIENT
Start: 2020-06-13 | End: 2020-06-17

## 2020-06-13 RX ORDER — ATORVASTATIN CALCIUM 80 MG/1
40 TABLET, FILM COATED ORAL AT BEDTIME
Refills: 0 | Status: DISCONTINUED | OUTPATIENT
Start: 2020-06-13 | End: 2020-06-17

## 2020-06-13 RX ORDER — CLOPIDOGREL BISULFATE 75 MG/1
75 TABLET, FILM COATED ORAL DAILY
Refills: 0 | Status: DISCONTINUED | OUTPATIENT
Start: 2020-06-13 | End: 2020-06-17

## 2020-06-13 RX ORDER — INSULIN GLARGINE 100 [IU]/ML
30 INJECTION, SOLUTION SUBCUTANEOUS AT BEDTIME
Refills: 0 | Status: DISCONTINUED | OUTPATIENT
Start: 2020-06-13 | End: 2020-06-17

## 2020-06-13 RX ADMIN — LOSARTAN POTASSIUM 100 MILLIGRAM(S): 100 TABLET, FILM COATED ORAL at 05:36

## 2020-06-13 RX ADMIN — Medication 50 MILLIGRAM(S): at 05:36

## 2020-06-13 RX ADMIN — Medication 25 MILLIGRAM(S): at 05:36

## 2020-06-13 RX ADMIN — INSULIN GLARGINE 30 UNIT(S): 100 INJECTION, SOLUTION SUBCUTANEOUS at 21:36

## 2020-06-13 RX ADMIN — GABAPENTIN 300 MILLIGRAM(S): 400 CAPSULE ORAL at 21:36

## 2020-06-13 RX ADMIN — CLOPIDOGREL BISULFATE 75 MILLIGRAM(S): 75 TABLET, FILM COATED ORAL at 11:10

## 2020-06-13 RX ADMIN — ATORVASTATIN CALCIUM 40 MILLIGRAM(S): 80 TABLET, FILM COATED ORAL at 21:36

## 2020-06-13 RX ADMIN — METFORMIN HYDROCHLORIDE 850 MILLIGRAM(S): 850 TABLET ORAL at 18:18

## 2020-06-13 RX ADMIN — METFORMIN HYDROCHLORIDE 850 MILLIGRAM(S): 850 TABLET ORAL at 05:36

## 2020-06-13 RX ADMIN — AMLODIPINE BESYLATE 10 MILLIGRAM(S): 2.5 TABLET ORAL at 05:35

## 2020-06-13 RX ADMIN — ESCITALOPRAM OXALATE 20 MILLIGRAM(S): 10 TABLET, FILM COATED ORAL at 11:10

## 2020-06-13 RX ADMIN — Medication 50 MILLIGRAM(S): at 18:18

## 2020-06-13 RX ADMIN — GABAPENTIN 300 MILLIGRAM(S): 400 CAPSULE ORAL at 05:35

## 2020-06-13 RX ADMIN — FAMOTIDINE 20 MILLIGRAM(S): 10 INJECTION INTRAVENOUS at 11:10

## 2020-06-13 RX ADMIN — GABAPENTIN 300 MILLIGRAM(S): 400 CAPSULE ORAL at 13:02

## 2020-06-13 NOTE — ED ADULT NURSE NOTE - NSIMPLEMENTINTERV_GEN_ALL_ED
Implemented All Fall with Harm Risk Interventions:  [place] to call system. Call bell, personal items and telephone within reach. Instruct patient to call for assistance. Room bathroom lighting operational. Non-slip footwear when patient is off stretcher. Physically safe environment: no spills, clutter or unnecessary equipment. Stretcher in lowest position, wheels locked, appropriate side rails in place. Provide visual cue, wrist band, yellow gown, etc. Monitor gait and stability. Monitor for mental status changes and reorient to person, place, and time. Review medications for side effects contributing to fall risk. Reinforce activity limits and safety measures with patient and family. Provide visual clues: red socks.

## 2020-06-13 NOTE — CONSULT NOTE ADULT - SUBJECTIVE AND OBJECTIVE BOX
[place] [facility] - [facility], [facility]                                   [address] [address]                                                      PHONE: [phone]                                                         FAX: [phone]  [email or web address]  ---------------------------------------------------------------------------------------------------------------------------------     49 years old male with history of HTN, DM multiple CVAs s/p ILR symptoms of left sided numbness/tingling staring last night.  He also c/o  L sided chest tightness which started 30 minutes prior to arrival. Pt states cp is resolved at this time, however still noting LUE and LLE tingling. Workup in ED shows negative trop, grossly unremarkable labs. CT head "No intracranial hemorrhage or mass effect.  He is follwed at [facility] with Dr. Rivera s/p TRINIDAD two years ago that showed no cardiac source of emboli.    	    PAST MEDICAL & SURGICAL HISTORY:  CVA (cerebral vascular accident): HTN  DM-2  No significant past surgical history      No Known Allergies      ROS:   Positive for   Rest of the systems were reviewed and was negative.     Social History:  No smoking   No alcohol  No other drug use    MEDICATIONS  (STANDING):  amLODIPine   Tablet 10 milliGRAM(s) Oral daily  atorvastatin 40 milliGRAM(s) Oral at bedtime  clopidogrel Tablet 75 milliGRAM(s) Oral daily  dextrose 5%. 1000 milliLiter(s) (50 mL/Hr) IV Continuous <Continuous>  dextrose 50% Injectable 12.5 Gram(s) IV Push once  dextrose 50% Injectable 25 Gram(s) IV Push once  dextrose 50% Injectable 25 Gram(s) IV Push once  escitalopram 20 milliGRAM(s) Oral daily  famotidine    Tablet 20 milliGRAM(s) Oral daily  gabapentin 300 milliGRAM(s) Oral three times a day  hydrochlorothiazide 25 milliGRAM(s) Oral daily  insulin glargine Injectable (LANTUS) 30 Unit(s) SubCutaneous at bedtime  losartan 100 milliGRAM(s) Oral daily  metFORMIN 850 milliGRAM(s) Oral two times a day  metoprolol tartrate 50 milliGRAM(s) Oral two times a day    MEDICATIONS  (PRN):  dextrose 40% Gel 15 Gram(s) Oral once PRN Blood Glucose LESS THAN 70 milliGRAM(s)/deciliter  glucagon  Injectable 1 milliGRAM(s) IntraMuscular once PRN Glucose LESS THAN 70 milligrams/deciliter  oxycodone    5 mG/acetaminophen 325 mG 1 Tablet(s) Oral every 6 hours PRN Severe Pain (7 - 10)      Vital Signs Last 24 Hrs  T(C): 36.3 (13 Jun 2020 07:17), Max: 36.7 (12 Jun 2020 21:48)  T(F): 97.3 (13 Jun 2020 07:17), Max: 98.1 (12 Jun 2020 21:48)  HR: 61 (13 Jun 2020 07:17) (61 - 79)  BP: 124/83 (13 Jun 2020 07:17) (124/83 - 163/89)  BP(mean): 114 (13 Jun 2020 04:25) (114 - 114)  RR: 18 (13 Jun 2020 07:17) (18 - 18)  SpO2: 98% (13 Jun 2020 07:17) (97% - 98%)  I&O's Summary      PHYSICAL EXAM:  Constitutional: Oriented to time, place and person. Appears well developed, well nourished; alert and co-operative  HEENT:     HeadNormal oral mucosa, PERRL, EOMI	  Cardiovascular: Normal S1 S2, No JVD, No murmurs  Respiratory: Lungs clear to auscultation; no crepitations, no wheeze  Gastrointestinal:  Soft, Non-tender, + BS	  Skin: Warm and dry  Neurologic: Alert oriented to time place and person  Psychiatric: affect was normal        LABS:                        13.0   9.43  )-----------( 258      ( 12 Jun 2020 22:30 )             38.9     06-12    136  |  99  |  14.0  ----------------------------<  360<H>  3.7   |  23.0  |  1.02    Ca    9.6      12 Jun 2020 22:30  Mg     1.8     06-12    TPro  7.7  /  Alb  4.1  /  TBili  <0.2<L>  /  DBili  x   /  AST  13  /  ALT  19  /  AlkPhos  77  06-12    BETSY COX  CARDIAC MARKERS ( 13 Jun 2020 04:59 )  x     / <0.01 ng/mL / x     / x     / x      CARDIAC MARKERS ( 13 Jun 2020 01:32 )  x     / <0.01 ng/mL / x     / x     / x      CARDIAC MARKERS ( 12 Jun 2020 22:30 )  x     / <0.01 ng/mL / x     / x     / x          EKG showed NSR    49y Male with prior history DM, HTN s/p CVA w left sided tingling sensation and CP.  Trop negative x3.   No need for in pt cardiac work up, ok to dc home from cardiac point of view.  HOLLY and echo as out pt.

## 2020-06-13 NOTE — ED CDU PROVIDER INITIAL DAY NOTE - PHYSICAL EXAMINATION
Gen: WD/WN, NAD, non-toxic  Head: NCAT  Neck:. Soft and supple. FROM, no midline ttp  Cardiac: RRR +S1S2.   Resp: Speaking in full sentences. No evidence of respiratory distress. No wheezes, rales or rhonchi.  Abd: +BS x 4. Soft, non-tender, non-distended. No guarding or rebound.  Back: Spine midline and non-tender. No CVAT.  MSK: FROM extremities x 4, no bony ttp  Lymph: No cervical lymphadenopathy.  Skin: Warm, dry, no rashes or lesions  Neuro: Awake, alert & oriented x 3. CN II-XII intact. Sensorimotor intact x 4. No focal deficits, No pronator drift, ambulatory with steady gait

## 2020-06-13 NOTE — ED CDU PROVIDER INITIAL DAY NOTE - ATTENDING CONTRIBUTION TO CARE
seen on rounds with acp this am  has continuous L paresthesia arm and leg  new WMD corona radiatia  MR pending, if neg safe dc  agree with plan  Albino jacinto

## 2020-06-13 NOTE — ED CDU PROVIDER INITIAL DAY NOTE - NS ED ROS FT
Gen: denies fever, chills, fatigue, weight loss  Skin: denies rashes, laceration, bruising  HEENT: denies visual changes, ear pain, nasal congestion, throat pain  Respiratory: denies NIETO, SOB, cough, wheezing  Cardiovascular: +CP. denies palpitations, diaphoresis, LE edema  GI: denies abdominal pain, n/v/d  : denies dysuria, frequency, urgency, bowel/bladder incontinence  MSK: denies joint swelling/pain, back pain, neck pain  Neuro: +LUE and LLE tingling/numbness. denies headache, dizziness, weakness  Psych: denies anxiety, depression, SI/HI, visual/auditory hallucinations

## 2020-06-13 NOTE — ED CDU PROVIDER INITIAL DAY NOTE - PROGRESS NOTE DETAILS
Pt has medtronic ILR SN# [id number]. Pt has [employer] card with him which states device is MRI compatible. Pt seen on morning rounds- Pt is a 49 years old male with history of HTN, DM multiple CVAs s/p ILR presented with  left sided numbness/tingling staring last night and then developed   L sided chest tightness which started 30 minutes prior to arrival. Pt states cp is resolved at this time, however still noting LUE and LLE tingling. Workup in ED shows negative trop, grossly unremarkable labs. CT head -No intracranial hemorrhage is seen. Patchy bilateral white matter lucencies are seen, new in the left corona radiata. No evidence for acute territorial infarct. Mild periventricular and subcortical white matter hypoattenuation without mass effect is noted, non-specific, but likely related to chronic small vessel ischemic changes. Ventricles and sulci are normal in size and configuration for patient's age. No mass effect or midline shift is seen. Basal cisterns are not effaced. Pt seen and cleared by Cardio- MRI pending on ASA, Plavix and Statin- will continue to follow Pt seen on morning rounds- Pt is a 49 years old male with history of HTN, DM multiple CVAs s/p ILR presented with  left sided numbness/tingling staring last night and then developed   L sided chest tightness which started 30 minutes prior to arrival. Pt states cp is resolved at this time, however still noting LUE and LLE tingling. Workup in ED shows negative trop, grossly unremarkable labs. CT head -No intracranial hemorrhage is seen. Patchy bilateral white matter lucencies are seen, new in the left corona radiata. No evidence for acute territorial infarct. Mild periventricular and subcortical white matter hypoattenuation without mass effect is noted, non-specific, but likely related to chronic small vessel ischemic changes. Ventricles and sulci are normal in size and configuration for patient's age. No mass effect or midline shift is seen. Basal cisterns are not effaced. Pt seen and cleared by Cardio- MRI pending on ASA, Plavix and Statin- Neuro paged for continued symptoms- will continue to follow Pt seen on morning rounds- Pt is a 49 years old male with history of HTN, DM multiple CVAs s/p ILR presented with  left sided numbness/tingling staring last night and then developed   L sided chest tightness which started 30 minutes prior to arrival. Pt states cp is resolved at this time, however still noting LUE and LLE tingling. Workup in ED shows negative trop, grossly unremarkable labs. CT head -No intracranial hemorrhage is seen. Patchy bilateral white matter lucencies are seen, new in the left corona radiata. No evidence for acute territorial infarct. Mild periventricular and subcortical white matter hypoattenuation without mass effect is noted, non-specific, but likely related to chronic small vessel ischemic changes. Ventricles and sulci are normal in size and configuration for patient's age. No mass effect or midline shift is seen. Basal cisterns are not effaced. Pt seen and cleared by Cardio- Spoke with Neuro (Dr Parker) and recommending  MRI and ILR interrogation. - MRI pending- Pt already  on ASA, Plavix and Statin-  ILR interrogated- pending results- will continue to follow Spoke with [employer]- ILR interrogated and no recent events and no events during the life span of the device Shift change- pt received from SKYLAR Lafleur. PT evaluated at bedside, resting comfortably. MRI head pending.

## 2020-06-13 NOTE — ED CDU PROVIDER INITIAL DAY NOTE - OBJECTIVE STATEMENT
48yo M pmhx CVA (multiple), HTN, DMII, LVH with medtronic ILR presented to ED c/o left sided numbness/tingling onset 1600pm. Pt also noted L sided chest tightness which started 30 minutes prior to arrival. Pt states cp is resolved at this time, however still noting LUE and LLE tingling. Workup in ED shows negative trop, grossly unremarkable labs. CT head "No intracranial hemorrhage or mass effect. Patchy white matter lucencies, new in the left corona radiata. Differential diagnosis is broad and includes chronic microvascular ischemic change, demyelination, vasculitis, migraine, infectious, inflammatory, toxic or metabolic etiologies." Denies ha, dizziness, fever, chills, abdominal pain, n/v/d, palpitations, LE edema, sob.  Cardiologist: [facility]

## 2020-06-13 NOTE — ED CDU PROVIDER INITIAL DAY NOTE - FAMILY HISTORY
Father  Still living? Unknown  FH: HTN (hypertension), Age at diagnosis: Age Unknown     Mother  Still living? Unknown  FHx: diabetes mellitus, Age at diagnosis: Age Unknown

## 2020-06-14 VITALS
SYSTOLIC BLOOD PRESSURE: 123 MMHG | TEMPERATURE: 98 F | DIASTOLIC BLOOD PRESSURE: 72 MMHG | HEART RATE: 66 BPM | RESPIRATION RATE: 16 BRPM | OXYGEN SATURATION: 99 %

## 2020-06-14 LAB — GLUCOSE BLDC GLUCOMTR-MCNC: 237 MG/DL — HIGH (ref 70–99)

## 2020-06-14 PROCEDURE — 93005 ELECTROCARDIOGRAM TRACING: CPT

## 2020-06-14 PROCEDURE — 70551 MRI BRAIN STEM W/O DYE: CPT | Mod: 26

## 2020-06-14 PROCEDURE — 85027 COMPLETE CBC AUTOMATED: CPT

## 2020-06-14 PROCEDURE — G0378: CPT

## 2020-06-14 PROCEDURE — 83735 ASSAY OF MAGNESIUM: CPT

## 2020-06-14 PROCEDURE — 96374 THER/PROPH/DIAG INJ IV PUSH: CPT

## 2020-06-14 PROCEDURE — 80053 COMPREHEN METABOLIC PANEL: CPT

## 2020-06-14 PROCEDURE — 99217: CPT

## 2020-06-14 PROCEDURE — 71046 X-RAY EXAM CHEST 2 VIEWS: CPT

## 2020-06-14 PROCEDURE — 36415 COLL VENOUS BLD VENIPUNCTURE: CPT

## 2020-06-14 PROCEDURE — 70551 MRI BRAIN STEM W/O DYE: CPT

## 2020-06-14 PROCEDURE — 99284 EMERGENCY DEPT VISIT MOD MDM: CPT | Mod: 25

## 2020-06-14 PROCEDURE — 70450 CT HEAD/BRAIN W/O DYE: CPT

## 2020-06-14 PROCEDURE — 82962 GLUCOSE BLOOD TEST: CPT

## 2020-06-14 PROCEDURE — 83880 ASSAY OF NATRIURETIC PEPTIDE: CPT

## 2020-06-14 PROCEDURE — 84484 ASSAY OF TROPONIN QUANT: CPT

## 2020-06-14 RX ORDER — ASPIRIN/CALCIUM CARB/MAGNESIUM 324 MG
1 TABLET ORAL
Qty: 30 | Refills: 0
Start: 2020-06-14 | End: 2020-07-13

## 2020-06-14 RX ADMIN — Medication 50 MILLIGRAM(S): at 05:24

## 2020-06-14 RX ADMIN — METFORMIN HYDROCHLORIDE 850 MILLIGRAM(S): 850 TABLET ORAL at 05:25

## 2020-06-14 RX ADMIN — Medication 25 MILLIGRAM(S): at 05:24

## 2020-06-14 RX ADMIN — Medication 1 MILLIGRAM(S): at 08:55

## 2020-06-14 RX ADMIN — AMLODIPINE BESYLATE 10 MILLIGRAM(S): 2.5 TABLET ORAL at 05:25

## 2020-06-14 RX ADMIN — GABAPENTIN 300 MILLIGRAM(S): 400 CAPSULE ORAL at 05:24

## 2020-06-14 RX ADMIN — LOSARTAN POTASSIUM 100 MILLIGRAM(S): 100 TABLET, FILM COATED ORAL at 05:25

## 2020-06-14 NOTE — ED CDU PROVIDER DISPOSITION NOTE - CARE PROVIDER_API CALL
Maurice Steiner)  Hematology  [address]  [place] [address]  Phone: [phone]  Fax: [phone]  Follow Up Time:     Timothy Parker  NEUROLOGY  [address] [place] [address]  Phone: [phone]  Fax: [phone]  Follow Up Time:     Jareth Aburto  CARDIOVASCULAR DISEASE  [address] [place] [address]  Phone: [phone]  Fax: [phone]  Follow Up Time:

## 2020-06-14 NOTE — ED CDU PROVIDER DISPOSITION NOTE - CLINICAL COURSE
Pt is an 49 years old male with history of HTN, DM, multiple CVAs s/p ILR admitted to observation for left sided CP which resolved and  left sided numbness/tingling  found to have an acute small right thalamic lacunar infarct. Symptoms improved and no motor deficients. Pt already on ASA, Plavix, Statin regiment- does reports some noncompliance with daily aspirin. Carotids done 1 year ago and normal and echo done July 2019  which showed no cardiac source of emboli. Case d/w Neurology Dr Parker and ok to be discharge with outpt followup. Pt  also had serial trop, ekg, tele monitoring, and ILR interrogated.  Pt seen and cleared by Cardio.  Pt to be dc home with continued ASA, Plavix, Statin and outpt f/u with cardio, Neuro and PCP Pt is an 49 years old male with history of HTN, DM, multiple CVAs s/p ILR admitted to observation for left sided CP which resolved and  left sided numbness/tingling  found to have an acute small right thalamic lacunar infarct. Symptoms improved and no motor deficients. Pt already on ASA, Plavix, Statin regiment- does reports some noncompliance with daily aspirin. Carotids done 1 year ago and normal and echo done July 2019  which showed no cardiac source of emboli. Case d/w Neurology Dr Parker and ok to be discharge with outpt followup. Pt  also had serial trop, ekg, tele monitoring, and ILR interrogated (no events).  Pt seen and cleared by Cardio.  Pt to be dc home with full ASA, Plavix, Statin and outpt f/u with cardio, Neuro and PCP. Will also refer to Hematology, as per reports never had a hypercoagulable workup.

## 2020-06-14 NOTE — ED CDU PROVIDER SUBSEQUENT DAY NOTE - HISTORY
No pertinent interval history. Vital signs remained stable overnight. Received no calls by RN overnight. PT resting comfortably.

## 2020-06-14 NOTE — ED CDU PROVIDER SUBSEQUENT DAY NOTE - ATTENDING CONTRIBUTION TO CARE
seen with acp  on orunds  in obs for neuro work up  mri + lacunar infarct  neuro to discuss case 48 hours out  echo last year unremarkable  agree with plan

## 2020-06-14 NOTE — ED CDU PROVIDER DISPOSITION NOTE - CARE PROVIDERS DIRECT ADDRESSES
,DirectAddress_Unknown,[email or web address],DirectAddress_Unknown

## 2020-06-14 NOTE — ED ADULT NURSE REASSESSMENT NOTE - NS ED NURSE REASSESS COMMENT FT1
Care endorsed to Joleen HERNANDEZ ED. Patient in NAD. Resting in comfort. VSS. RN with no further questions.
Patient in NAD. VSS. Pending Cardiology consult. Patient with no further questions for the RN. Will continue to monitor.
Pt received at 1919. Chart as noted. Pt is A&OX3. Pt denies pain, N/V/D. Pt VSS, NSR on cardiac monitor. Pt in NAD at this time. Pt moves all extremities equal and bilateral. Plan of care and wait time explained. Pt awaiting MRI.  Safety maintained.
Pt resting in bed comfortably. VSS. Pt is A&Ox3. Pt is in NAD at this time. Plan of care and wait time explained. Pt is awaiting MRI.
Report received from off going RN care of pt assumed at 0730. pt fast asleep resp even and unlabored.  arousable to voice denies any complaints at this time. pt awaiting MRI test for dispo. gross neuro exam intact. updated on plan of care,
pt alert oriented moving all extremities equal bilaterally with no acute distress at this time offers no complaints at this time.  resting comfortably
pt daughter called colleen [phone]
Assumed care of the patient at 0730. Patient A&Ox4. No s/s of distress or pain. States LUE tingling persists. No neuro deficits noted. NIH 0. Patient pending MRI testing this am. Patient in understanding of plan of care. Patient with no further questions for the RN. Resting in comfort. Call bell within reach and encouraged to use when assistance needed. Will continue to monitor.

## 2020-06-14 NOTE — ED CDU PROVIDER DISPOSITION NOTE - PROVIDER TOKENS
PROVIDER:[TOKEN:[[id number]]],PROVIDER:[TOKEN:[[id number]]],PROVIDER:[TOKEN:[[id number]]]

## 2020-06-14 NOTE — ED CDU PROVIDER DISPOSITION NOTE - PATIENT PORTAL LINK FT
You can access the FollowMyHealth Patient Portal offered by [facility] by registering at the following website: [email or web address]. By joining [facility]’s FollowMyHealth portal, you will also be able to view your health information using other applications (apps) compatible with our system.

## 2020-06-14 NOTE — ED CDU PROVIDER DISPOSITION NOTE - NSFOLLOWUPINSTRUCTIONS_ED_ALL_ED_FT
Follow up with Neurology  Follow up with Cardiology  Follow up with PMD    Continue all your medication as directed  -Plavix 75 mg daily  -Atorvastatin 40mg daily  Compliance with Aspirin 81mg daily Follow up with Neurology  Follow up with Cardiology  Follow up with PMD  Follow up with Hematologist    Continue all your medication as directed  -Plavix 75 mg daily  -Atorvastatin 40mg daily  Compliance with Aspirin 81mg daily

## 2020-09-17 ENCOUNTER — TRANSCRIPTION ENCOUNTER (OUTPATIENT)
Age: 50
End: 2020-09-17

## 2020-09-17 RX ORDER — LOSARTAN POTASSIUM AND HYDROCHLOROTHIAZIDE 25; 100 MG/1; MG/1
100-25 TABLET ORAL DAILY
Qty: 30 | Refills: 0 | Status: ACTIVE | COMMUNITY
Start: 2019-01-18 | End: 1900-01-01

## 2020-09-17 RX ORDER — METFORMIN HYDROCHLORIDE 850 MG/1
850 TABLET, COATED ORAL TWICE DAILY
Qty: 60 | Refills: 0 | Status: ACTIVE | COMMUNITY
Start: 2018-06-14 | End: 1900-01-01

## 2020-09-17 RX ORDER — TRAZODONE HYDROCHLORIDE 50 MG/1
50 TABLET ORAL
Qty: 45 | Refills: 0 | Status: ACTIVE | COMMUNITY
Start: 2019-02-11 | End: 1900-01-01

## 2020-09-17 RX ORDER — GABAPENTIN 300 MG/1
300 CAPSULE ORAL 3 TIMES DAILY
Qty: 90 | Refills: 0 | Status: ACTIVE | COMMUNITY
Start: 2020-03-18 | End: 1900-01-01

## 2020-09-17 RX ORDER — ATORVASTATIN CALCIUM 40 MG/1
40 TABLET, FILM COATED ORAL DAILY
Qty: 90 | Refills: 0 | Status: ACTIVE | COMMUNITY
Start: 2018-06-29 | End: 1900-01-01

## 2020-09-17 RX ORDER — IBUPROFEN 800 MG/1
800 TABLET, FILM COATED ORAL 3 TIMES DAILY
Qty: 60 | Refills: 0 | Status: ACTIVE | COMMUNITY
Start: 2019-08-16 | End: 1900-01-01

## 2020-09-17 RX ORDER — FAMOTIDINE 20 MG/1
20 TABLET, FILM COATED ORAL TWICE DAILY
Qty: 60 | Refills: 0 | Status: ACTIVE | COMMUNITY
Start: 2018-06-14 | End: 1900-01-01

## 2020-09-17 RX ORDER — CLOPIDOGREL BISULFATE 75 MG/1
75 TABLET, FILM COATED ORAL DAILY
Qty: 90 | Refills: 0 | Status: ACTIVE | COMMUNITY
Start: 1900-01-01 | End: 1900-01-01

## 2020-09-17 RX ORDER — ESCITALOPRAM OXALATE 20 MG/1
20 TABLET ORAL DAILY
Qty: 30 | Refills: 0 | Status: ACTIVE | COMMUNITY
Start: 2018-06-14 | End: 1900-01-01

## 2020-10-27 NOTE — PROCEDURE NOTE - ATTENDING PROVIDER
Dr. Vazquez Area H Indication Text: Tumors in this location are included in Area H (eyelids, eyebrows, nose, lips, chin, ear, pre-auricular, post-auricular, temple, genitalia, hands, feet, ankles and areola).  Tissue conservation is critical in these anatomic locations.

## 2021-05-06 NOTE — OCCUPATIONAL THERAPY INITIAL EVALUATION ADULT - NS ASR OT EQUIP NEEDS DISCH
Render Risk Assessment In Note?: no Detail Level: Zone Additional Notes: Pt lanced lesion last night with needle he cleaned with iodine. Encouraged pt to use warm compresses instead of lancing area with needle. Antiobiotics Rx’d should cover for bacterial infection shower chair/rolling walker (5 inch wheels)/bedside commode

## 2021-11-01 NOTE — ED CDU PROVIDER SUBSEQUENT DAY NOTE - DATA REVIEWED, MDM
Wound Care Provider Visit (30 mins RNx2 60 mins total)    Patient seen in Clinic by: VERONICA WESELY; RN assisted by: Katja PERRY    Wound(s) debrided by provider: No Consent obtained/verified: NA    Wound care orders/updates: Yes - switch to idoflex on right achilles    Lab(s)/additional orders placed this visit: No    Patient education complete: Yes    Patient verbalized understanding of education/patient questions answered: Yes    AVS provided to patient/caregiver/facility: Yes        
vital signs/nurses notes

## 2022-03-04 NOTE — PHYSICAL THERAPY INITIAL EVALUATION ADULT - LEVEL OF INDEPENDENCE: GAIT, REHAB EVAL
Heart Failure - Care Day 7 (3/2/2022) by Medhat Silver RN       Review Status Review Entered   Completed 3/3/2022 15:35      Criteria Review      Care Day: 7 Care Date: 3/2/2022 Level of Care: Telemetry    Guideline Day 3    Level Of Care    ( ) Floor to discharge    3/3/2022 15:35:57 EST by Medhat Silver      tele    Clinical Status    ( ) * Hemodynamic stability    3/3/2022 15:35:57 EST by Medhat Silver      97.9 79 121/73 18 96%RA 85.9kg    (X) * Tachypnea absent    3/3/2022 15:35:57 EST by Medhat Silver      rr 18    (X) * Oxygenation at baseline or acceptable for next level of care    3/3/2022 15:35:57 EST by Medhat Silver      RA    ( ) * Dyspnea at baseline or acceptable for next level of care    ( ) * Cardiac rate and rhythm acceptable    ( ) * Pulmonary edema absent or acceptable for next level of care    ( ) * Peripheral or sacral edema absent or acceptable for next level of care    ( ) * Mental status at baseline    ( ) * Volume status acceptable on oral medication    ( ) * Renal function at baseline or acceptable for next level of care    ( ) * Electrolyte abnormalities absent or acceptable for next level of care    ( ) * Precipitating factors absent or controlled    ( ) * Discharge plans and education understood    Activity    (X) * Ambulatory or acceptable for next level of care    3/3/2022 15:35:57 EST by Medhat Silver      ambulate with assist    Routes    (X) * Oral hydration    3/3/2022 15:35:57 EST by Medhat Silver      Regular; Low Fat/Low Chol/High Fiber/2 gm Na; 2000 ml    (X) * Oral medications or regimen acceptable for next level of care    3/3/2022 15:35:57 EST by Medhat Silver      duoneb 3ml q6 amiodaron 200mg [id number] aspirin 81mg podaily lipitor 80mg podaily neurontin 300mg [id number] norco 5-325mg poq4 prnx4 corlanor 7.5mg qq6ritabe morphine 2mg ivq4 prnx1 compazine 5mg ivq4 prnx3 aldactone 50mg podaily diovan 40mg podaily    (X) * Oral diet or acceptable for next level of care 3/3/2022 15:35:57 EST by Lora Alcantar      Regular; Low Fat/Low Chol/High Fiber/2 gm Na; 2000 ml    Interventions    (X) * Oxygen absent    3/3/2022 15:35:57 EST by Lora Alcantar      RA    (X) Weigh    3/3/2022 15:35:57 EST by Lora Alcantar      85.9kg    Medications    (X) Diuretics    3/3/2022 15:35:57 EST by Lora Alcantar      lasix 40mg [id number]    (X) Beta-blocker    3/3/2022 15:35:57 EST by Lora Alcantar      coreg 12.5mg [id number]    * Milestone   Additional Notes   3/2 LOC IP Tele      Labs   WBC: 5.6   RBC: 3.46 (L)   HGB: 10.3 (L)   HCT: 33.2 (L)   MCV: 96.0   MCH: 29.8   MCHC: 31.0 (L)   RDW: 14.8 (H)   PLATELET: 736   MPV: 10.5   ABSOLUTE NRBC: 0.00   Sodium: 135 (L)   Potassium: 4.0   Chloride: 106   CO2: 23   Anion gap: 6 (L)   Glucose: 112 (H)   BUN: 6   Creatinine: 1.20   Calcium: 8.6   GFR est non-AA: >60   GFR est AA: >60      Dupplex: 1. No evidence of deep vein thrombosis. Attending   Assessment/Plan:       Principal Problem:     Acute systolic congestive heart failure ([address] (2/24/2022)   Severe LV dysfunction.  EF 15 to 20%.  Volume appears improved.  We will give IV Lasix this morning and then transition to oral Lasix.  Currently on Coreg, Diovan and Aldactone.       Active Problems:     HTN (hypertension) (11/29/2011)   Blood pressure control.         Hypokalemia (7/3/2020)     K for 4.          Tachycardia (2/24/2022)   Rate improved.  Continue carvedilol and Corlanor.          Hypomagnesemia (2/26/2022)     Replete > 2.          ICD (implantable cardioverter-defibrillator) discharge (2/26/2022)     On amiodarone.  Episode of nonsustained ventricular tachycardia last night.  We will continue to monitor.        Physical Exam:   General-No Acute Distress   Neck- supple, no JVD   CV- regular rate and rhythm Grade I/Vi IRVIN   Lung- clear bilaterally   Abd- soft, nontender, nondistended   Ext- trivial left and 1+ right edema   Skin- warm and dry        Heart Failure - Care Day 6 (3/1/2022) by Kamini Mejia RN       Review Status Review Entered   Completed 3/3/2022 15:27      Criteria Review      Care Day: 6 Care Date: 3/1/2022 Level of Care: Telemetry    Guideline Day 3    Level Of Care    ( ) Floor to discharge    3/3/2022 15:27:13 EST by Kamini Mejia      tele    Clinical Status    ( ) * Hemodynamic stability    3/3/2022 15:27:13 EST by Kamini Mejia      98.3 83 81/52 18 95%RA 89.6kg    (X) * Tachypnea absent    3/3/2022 15:27:13 EST by Kamini Mejia      rr 18    (X) * Oxygenation at baseline or acceptable for next level of care    3/3/2022 15:27:13 EST by Kamini Mejia      RA    ( ) * Dyspnea at baseline or acceptable for next level of care    ( ) * Cardiac rate and rhythm acceptable    ( ) * Pulmonary edema absent or acceptable for next level of care    ( ) * Peripheral or sacral edema absent or acceptable for next level of care    ( ) * Mental status at baseline    ( ) * Volume status acceptable on oral medication    ( ) * Renal function at baseline or acceptable for next level of care    ( ) * Electrolyte abnormalities absent or acceptable for next level of care    ( ) * Precipitating factors absent or controlled    ( ) * Discharge plans and education understood    Activity    (X) * Ambulatory or acceptable for next level of care    3/3/2022 15:27:13 EST by Kamini Mejia      ambulate with assit    Routes    (X) * Oral hydration    3/3/2022 15:27:13 EST by Kamini Mejia      Regular; Low Fat/Low Chol/High Fiber/2 gm Na; 2000 ml    (X) * Oral medications or regimen acceptable for next level of care    3/3/2022 15:27:13 EST by Kamini Mejia      duoneb 3ml q6 amiodarone 200mg [id number] aspirin 81mg podaily lipitor 80mg podaily norco 5-325mg tab poq4 prnx2 corlanor 7.5mg [id number] morphine 2mg ivq4  prnx2 compazine 5mg ivq4 prnx4 aldactone 50mg podaily valsartan 40mg podaily mag 2g ivx1    (X) * Oral diet or acceptable for next level of care    3/3/2022 15:27:13 EST by Olive Simrobert      Regular; Low Fat/Low Chol/High Fiber/2 gm Na; 2000 ml    Interventions    (X) * Oxygen absent    3/3/2022 15:27:13 EST by Stacy Snare    (X) Weigh    3/3/2022 15:27:13 EST by Olive Simmer      89.6kg    Medications    (X) Diuretics    3/3/2022 15:27:13 EST by Olive Simmer      lasix 40mg [id number] lasix 40mg [id number]    (X) Beta-blocker    3/3/2022 15:27:13 EST by Olive Simrobert      coreg 12.5mg [id number]    * Milestone   Additional Notes   3/1 LOC IP tele      Labs   WBC: 5.6   RBC: 3.47 (L)   HGB: 10.6 (L)   HCT: 32.9 (L)   MCV: 94.8   MCH: 30.5   MCHC: 32.2   RDW: 15.1 (H)   PLATELET: 102 (L)   MPV: 10.0   ABSOLUTE NRBC: 0.00   Sodium: 139   Potassium: 4.2   Chloride: 109 (H)   CO2: 23   Anion gap: 7   Glucose: 96   BUN: 2 (L)   Creatinine: 0.90   Calcium: 8.1 (L)   Magnesium: 1.9   GFR est non-AA: >60   GFR est AA: >60      Cardio   Assessment/Plan:       Principal Problem:     Acute systolic congestive heart failure ([address] (2/24/2022)   Severe LV dysfunction.  EF 15 to 20%.   Volume appears improved.  We will give IV Lasix this morning and then transition to oral Lasix.  Currently on Coreg, Diovan and Aldactone.       Active Problems:     HTN (hypertension) (11/29/2011)   Blood pressure control.         Hypokalemia (7/3/2020)     K for 4.          Tachycardia (2/24/2022)   Rate improved.  Continue carvedilol and Corlanor.          Hypomagnesemia (2/26/2022)     Replete > 2.          ICD (implantable cardioverter-defibrillator) discharge (2/26/2022)     On amiodarone.  Episode of nonsustained ventricular tachycardia last night.  We will continue to monitor.        Physical Exam:   General-No Acute Distress   Neck- supple, no JVD   CV- regular rate and rhythm Grade I/Vi IRVIN   Lung- clear bilaterally   Abd- soft, nontender, nondistended   Ext- trivial left and 1+ right edema   Skin- warm and dry     supervision

## 2023-04-02 NOTE — PHYSICAL EXAM
Improved [No Acute Distress] : no acute distress [Well Nourished] : well nourished [Well Developed] : well developed [Well-Appearing] : well-appearing [Normal Sclera/Conjunctiva] : normal sclera/conjunctiva [PERRL] : pupils equal round and reactive to light [EOMI] : extraocular movements intact [Normal Outer Ear/Nose] : the outer ears and nose were normal in appearance [Normal Oropharynx] : the oropharynx was normal [No JVD] : no jugular venous distention [Supple] : supple [No Lymphadenopathy] : no lymphadenopathy [Thyroid Normal, No Nodules] : the thyroid was normal and there were no nodules present [No Respiratory Distress] : no respiratory distress  [Clear to Auscultation] : lungs were clear to auscultation bilaterally [No Accessory Muscle Use] : no accessory muscle use [Normal Rate] : normal rate  [Regular Rhythm] : with a regular rhythm [Normal S1, S2] : normal S1 and S2 [No Murmur] : no murmur heard [No Carotid Bruits] : no carotid bruits [No Abdominal Bruit] : a ~M bruit was not heard ~T in the abdomen [No Varicosities] : no varicosities [Pedal Pulses Present] : the pedal pulses are present [No Edema] : there was no peripheral edema [No Extremity Clubbing/Cyanosis] : no extremity clubbing/cyanosis [No Palpable Aorta] : no palpable aorta [Soft] : abdomen soft [Non Tender] : non-tender [Non-distended] : non-distended [No Masses] : no abdominal mass palpated [No HSM] : no HSM [Normal Bowel Sounds] : normal bowel sounds [Normal Posterior Cervical Nodes] : no posterior cervical lymphadenopathy [Normal Anterior Cervical Nodes] : no anterior cervical lymphadenopathy [No CVA Tenderness] : no CVA  tenderness [No Spinal Tenderness] : no spinal tenderness [No Joint Swelling] : no joint swelling [Grossly Normal Strength/Tone] : grossly normal strength/tone [No Rash] : no rash [Normal Gait] : normal gait [Coordination Grossly Intact] : coordination grossly intact [No Focal Deficits] : no focal deficits [Deep Tendon Reflexes (DTR)] : deep tendon reflexes were 2+ and symmetric [Normal Affect] : the affect was normal [Normal Insight/Judgement] : insight and judgment were intact

## 2024-04-16 NOTE — PATIENT PROFILE ADULT - FALL HARM RISK
Please send me a message in [employer] if things aren't going as expected or you are unsure about something we discussed. If this isn't working, you can call the psychiatry department line at [phone], or leave me a voicemail at [phone].  If you are having thoughts of harming yourself or ending your life, please call the national suicide hotline 24/7 at 234. For this and other mental health emergencies, such as losing touch with reality, call the [facility] 24/7 mobile crisis hotline at 443-971-6125, or dial 911 for emergency services.     other

## 2024-05-06 NOTE — ED PROVIDER NOTE - TEMPLATE
No care due was identified.  [facility] Embedded Care Due Messages. Reference number: [id number].   5/06/2024 9:57:06 AM CDT   Cardiac

## 2024-08-03 NOTE — REVIEW OF SYSTEMS
Inpatient Cardiology Consultation      Reason for Consult:  HF    Consulting Physician: Dr. Harvey    Requesting Physician:  Dr. Pelaez    Date of Consultation: 8/3/2024    HISTORY OF PRESENT ILLNESS:   Len Dorsey  is a 58 y.o.  male known to [facility] and followed by DR Bustos.    Full PMH: see below  Admit 2/27/2024 - 3/5/2024: Acute heart failure diuresed with Bumex drip transition to p.o.  FRANTZ on CKD. Seen by Dr Lyles     Admit 5/7/2024 - 5/19/2024:  Seen by DR. Hicks inpatient 5/2024 for CP.  / ETOH withdrawal & reportedly-had not taken Coumadin in 3 days.  No food for 3 days. Troponin 54-46-- chronic / proBNP 829 INR on admit greater than 10 >> vitamin K 5 mg IV>> repeat 4.7 >>then required IV Heparin / Alcohol withdrawal / FRANTZ creatinine 2.1>> 1.8>> 1.7 spironolactone held / hypotension-- started midodrine 2.5 mg p.o. 3 times a day /EGD 5/9/2024: No varices or Marion-Diaz tear.  Mild gastritis.  Mild portal hypertensive gastropathy.  Mild-moderate duodenitis.  No AVMs.octreotide drip. protonix to BID x 8 weeks then daily. No NSAID's Recommend outpt capsule endoscopy.   Discharge Cardiac Meds: Hydralazine 10 mg 3 times daily, Isordil 10 mg 3 times daily, midodrine 5 mg 3 times daily, aspirin 81 daily, Lipitor 20 mg daily, Jardiance 10 mg daily, Toprol-XL 25 mg twice daily.  Bumex and spironolactone were stopped.    [facility] ED 8/2/2024 1204 via self for SOB / fluid overload  Arrival vitals: BP 95/59  SpO2 97% on room air afebrile  Significant Labs: Troponin 45-42 proBNP 1489 BUN 20 CR 1.8>> BUN 17 CR 1.6 K3.5 mag 1.4 lactic acid 1.9 phosphorus 2.6 ammonia 70 albumin 3.5 alk phos 142 ALT 9 AST 16 lipase 32 WBC 8.4 Hgb 6.9>> 7.9>> 8.1  iron 20 L ferritin 18 TIBC 392 folate 9.2 vitamin B12 177 8% iron 5L INR 5  UA:protein with trace hemoglobin  RAD: Portable chest x-ray:Cardiomegaly with underlying chronic interstitial changes seen in the lung fields. No new focal parenchymal  [Impotence] : impotence

## 2025-07-29 NOTE — OCCUPATIONAL THERAPY INITIAL EVALUATION ADULT - PROPRIOCEPTION, LUE, OT EVAL
Shayna message rcvd by pt caregiver as below:    I left a voicemail after receiving two rescheduling notices. She has an upcoming appointment with [facility] regarding her sleep apnea oral device. During this visit, she’ll try on the device on the 28th, and they’ll begin weekly follow-ups from there. After her appointment, I’ll follow up to determine if she needs to be seen again.     I cancelled the apt and we schedule it out for when she starts using it.    within normal limits